# Patient Record
Sex: MALE | Race: WHITE | HISPANIC OR LATINO | Employment: FULL TIME | URBAN - METROPOLITAN AREA
[De-identification: names, ages, dates, MRNs, and addresses within clinical notes are randomized per-mention and may not be internally consistent; named-entity substitution may affect disease eponyms.]

---

## 2021-07-26 ENCOUNTER — APPOINTMENT (OUTPATIENT)
Dept: RADIOLOGY | Facility: MEDICAL CENTER | Age: 26
End: 2021-07-26
Attending: ORTHOPAEDIC SURGERY
Payer: COMMERCIAL

## 2021-07-26 ENCOUNTER — ANESTHESIA (OUTPATIENT)
Dept: SURGERY | Facility: MEDICAL CENTER | Age: 26
End: 2021-07-26
Payer: COMMERCIAL

## 2021-07-26 ENCOUNTER — ANESTHESIA EVENT (OUTPATIENT)
Dept: SURGERY | Facility: MEDICAL CENTER | Age: 26
End: 2021-07-26
Payer: COMMERCIAL

## 2021-07-26 ENCOUNTER — HOSPITAL ENCOUNTER (OUTPATIENT)
Facility: MEDICAL CENTER | Age: 26
End: 2021-07-26
Attending: ORTHOPAEDIC SURGERY | Admitting: ORTHOPAEDIC SURGERY
Payer: COMMERCIAL

## 2021-07-26 VITALS
SYSTOLIC BLOOD PRESSURE: 120 MMHG | BODY MASS INDEX: 19.17 KG/M2 | TEMPERATURE: 96.7 F | OXYGEN SATURATION: 97 % | HEART RATE: 90 BPM | DIASTOLIC BLOOD PRESSURE: 67 MMHG | RESPIRATION RATE: 18 BRPM | HEIGHT: 67 IN | WEIGHT: 122.14 LBS

## 2021-07-26 DIAGNOSIS — G89.18 POSTOPERATIVE PAIN OF EXTREMITY: ICD-10-CM

## 2021-07-26 DIAGNOSIS — M79.609 POSTOPERATIVE PAIN OF EXTREMITY: ICD-10-CM

## 2021-07-26 LAB
SARS-COV+SARS-COV-2 AG RESP QL IA.RAPID: NOTDETECTED
SPECIMEN SOURCE: NORMAL

## 2021-07-26 PROCEDURE — 700105 HCHG RX REV CODE 258: Performed by: ORTHOPAEDIC SURGERY

## 2021-07-26 PROCEDURE — 160035 HCHG PACU - 1ST 60 MINS PHASE I: Performed by: ORTHOPAEDIC SURGERY

## 2021-07-26 PROCEDURE — 700101 HCHG RX REV CODE 250: Performed by: ORTHOPAEDIC SURGERY

## 2021-07-26 PROCEDURE — 700102 HCHG RX REV CODE 250 W/ 637 OVERRIDE(OP): Performed by: ANESTHESIOLOGY

## 2021-07-26 PROCEDURE — C1713 ANCHOR/SCREW BN/BN,TIS/BN: HCPCS | Performed by: ORTHOPAEDIC SURGERY

## 2021-07-26 PROCEDURE — 73650 X-RAY EXAM OF HEEL: CPT | Mod: LT

## 2021-07-26 PROCEDURE — 87426 SARSCOV CORONAVIRUS AG IA: CPT

## 2021-07-26 PROCEDURE — 160009 HCHG ANES TIME/MIN: Performed by: ORTHOPAEDIC SURGERY

## 2021-07-26 PROCEDURE — 160048 HCHG OR STATISTICAL LEVEL 1-5: Performed by: ORTHOPAEDIC SURGERY

## 2021-07-26 PROCEDURE — 501838 HCHG SUTURE GENERAL: Performed by: ORTHOPAEDIC SURGERY

## 2021-07-26 PROCEDURE — 64445 NJX AA&/STRD SCIATIC NRV IMG: CPT | Performed by: ORTHOPAEDIC SURGERY

## 2021-07-26 PROCEDURE — 160002 HCHG RECOVERY MINUTES (STAT): Performed by: ORTHOPAEDIC SURGERY

## 2021-07-26 PROCEDURE — A9270 NON-COVERED ITEM OR SERVICE: HCPCS | Performed by: ORTHOPAEDIC SURGERY

## 2021-07-26 PROCEDURE — 160036 HCHG PACU - EA ADDL 30 MINS PHASE I: Performed by: ORTHOPAEDIC SURGERY

## 2021-07-26 PROCEDURE — 700101 HCHG RX REV CODE 250: Performed by: STUDENT IN AN ORGANIZED HEALTH CARE EDUCATION/TRAINING PROGRAM

## 2021-07-26 PROCEDURE — 700111 HCHG RX REV CODE 636 W/ 250 OVERRIDE (IP): Performed by: STUDENT IN AN ORGANIZED HEALTH CARE EDUCATION/TRAINING PROGRAM

## 2021-07-26 PROCEDURE — 160041 HCHG SURGERY MINUTES - EA ADDL 1 MIN LEVEL 4: Performed by: ORTHOPAEDIC SURGERY

## 2021-07-26 PROCEDURE — A9270 NON-COVERED ITEM OR SERVICE: HCPCS | Performed by: ANESTHESIOLOGY

## 2021-07-26 PROCEDURE — 160046 HCHG PACU - 1ST 60 MINS PHASE II: Performed by: ORTHOPAEDIC SURGERY

## 2021-07-26 PROCEDURE — 160047 HCHG PACU  - EA ADDL 30 MINS PHASE II: Performed by: ORTHOPAEDIC SURGERY

## 2021-07-26 PROCEDURE — 160029 HCHG SURGERY MINUTES - 1ST 30 MINS LEVEL 4: Performed by: ORTHOPAEDIC SURGERY

## 2021-07-26 PROCEDURE — 502000 HCHG MISC OR IMPLANTS RC 0278: Performed by: ORTHOPAEDIC SURGERY

## 2021-07-26 PROCEDURE — 160025 RECOVERY II MINUTES (STATS): Performed by: ORTHOPAEDIC SURGERY

## 2021-07-26 DEVICE — SCREW LOCKING SELF TAPPING WITH T8 STARDRIVE VA  RECESS 2.7MM 30MM (1TX6+1TX5=11): Type: IMPLANTABLE DEVICE | Site: FOOT | Status: FUNCTIONAL

## 2021-07-26 DEVICE — SCREW METAPHYSEAL SELF TAPPING WITH T8 STARDRIVE RECESS 2.7MM 36MM (1TX6+1TX2=8): Type: IMPLANTABLE DEVICE | Site: FOOT | Status: FUNCTIONAL

## 2021-07-26 DEVICE — SCREW LOCKING SELF TAPPING WITH T8 STARDRIVE VA  RECESS 2.7MM 28MM (1TX6+1TX3=9): Type: IMPLANTABLE DEVICE | Site: FOOT | Status: FUNCTIONAL

## 2021-07-26 DEVICE — IMPLANTABLE DEVICE: Type: IMPLANTABLE DEVICE | Site: FOOT | Status: FUNCTIONAL

## 2021-07-26 DEVICE — SCREW LOCKING SELF TAPPING WITH T8 STARDRIVE VA RECESS 2.7MM 32MM (1TX6+1TX5=11): Type: IMPLANTABLE DEVICE | Site: FOOT | Status: FUNCTIONAL

## 2021-07-26 DEVICE — SCREW LOCKING SELF TAPPING WITH T8 STARDRIVE VA RECESS 2.7MM 34MM (1TX6+1TX5=11): Type: IMPLANTABLE DEVICE | Site: FOOT | Status: FUNCTIONAL

## 2021-07-26 DEVICE — SCREW LOCKING SELF TAPPING WITH T8 STARDRIVE VA RECESS 2.7MM 36MM (1TX6+1TX5=11): Type: IMPLANTABLE DEVICE | Site: FOOT | Status: FUNCTIONAL

## 2021-07-26 RX ORDER — ROCURONIUM BROMIDE 10 MG/ML
INJECTION, SOLUTION INTRAVENOUS PRN
Status: DISCONTINUED | OUTPATIENT
Start: 2021-07-26 | End: 2021-07-26 | Stop reason: SURG

## 2021-07-26 RX ORDER — ONDANSETRON 2 MG/ML
4 INJECTION INTRAMUSCULAR; INTRAVENOUS
Status: DISCONTINUED | OUTPATIENT
Start: 2021-07-26 | End: 2021-07-26 | Stop reason: HOSPADM

## 2021-07-26 RX ORDER — SODIUM CHLORIDE, SODIUM LACTATE, POTASSIUM CHLORIDE, CALCIUM CHLORIDE 600; 310; 30; 20 MG/100ML; MG/100ML; MG/100ML; MG/100ML
INJECTION, SOLUTION INTRAVENOUS CONTINUOUS
Status: DISCONTINUED | OUTPATIENT
Start: 2021-07-26 | End: 2021-07-26 | Stop reason: HOSPADM

## 2021-07-26 RX ORDER — ACETAMINOPHEN 500 MG
1000 TABLET ORAL ONCE
Status: COMPLETED | OUTPATIENT
Start: 2021-07-26 | End: 2021-07-26

## 2021-07-26 RX ORDER — HYDROMORPHONE HYDROCHLORIDE 1 MG/ML
0.2 INJECTION, SOLUTION INTRAMUSCULAR; INTRAVENOUS; SUBCUTANEOUS
Status: DISCONTINUED | OUTPATIENT
Start: 2021-07-26 | End: 2021-07-26 | Stop reason: HOSPADM

## 2021-07-26 RX ORDER — MIDAZOLAM HYDROCHLORIDE 1 MG/ML
INJECTION INTRAMUSCULAR; INTRAVENOUS PRN
Status: DISCONTINUED | OUTPATIENT
Start: 2021-07-26 | End: 2021-07-26 | Stop reason: SURG

## 2021-07-26 RX ORDER — OXYCODONE AND ACETAMINOPHEN 10; 325 MG/1; MG/1
.5-1 TABLET ORAL EVERY 4 HOURS PRN
Qty: 42 TABLET | Refills: 0 | Status: SHIPPED | OUTPATIENT
Start: 2021-07-26 | End: 2021-08-02

## 2021-07-26 RX ORDER — HYDROMORPHONE HYDROCHLORIDE 1 MG/ML
0.1 INJECTION, SOLUTION INTRAMUSCULAR; INTRAVENOUS; SUBCUTANEOUS
Status: DISCONTINUED | OUTPATIENT
Start: 2021-07-26 | End: 2021-07-26 | Stop reason: HOSPADM

## 2021-07-26 RX ORDER — PROPOFOL 10 MG/ML
INJECTION, EMULSION INTRAVENOUS PRN
Status: DISCONTINUED | OUTPATIENT
Start: 2021-07-26 | End: 2021-07-26 | Stop reason: SURG

## 2021-07-26 RX ORDER — BUPIVACAINE HYDROCHLORIDE 5 MG/ML
INJECTION, SOLUTION EPIDURAL; INTRACAUDAL PRN
Status: DISCONTINUED | OUTPATIENT
Start: 2021-07-26 | End: 2021-07-26 | Stop reason: SURG

## 2021-07-26 RX ORDER — HALOPERIDOL 5 MG/ML
1 INJECTION INTRAMUSCULAR
Status: DISCONTINUED | OUTPATIENT
Start: 2021-07-26 | End: 2021-07-26 | Stop reason: HOSPADM

## 2021-07-26 RX ORDER — HYDROMORPHONE HYDROCHLORIDE 1 MG/ML
0.4 INJECTION, SOLUTION INTRAMUSCULAR; INTRAVENOUS; SUBCUTANEOUS
Status: DISCONTINUED | OUTPATIENT
Start: 2021-07-26 | End: 2021-07-26 | Stop reason: HOSPADM

## 2021-07-26 RX ORDER — MEPERIDINE HYDROCHLORIDE 25 MG/ML
12.5 INJECTION INTRAMUSCULAR; INTRAVENOUS; SUBCUTANEOUS
Status: DISCONTINUED | OUTPATIENT
Start: 2021-07-26 | End: 2021-07-26 | Stop reason: HOSPADM

## 2021-07-26 RX ORDER — SODIUM CHLORIDE, SODIUM LACTATE, POTASSIUM CHLORIDE, CALCIUM CHLORIDE 600; 310; 30; 20 MG/100ML; MG/100ML; MG/100ML; MG/100ML
INJECTION, SOLUTION INTRAVENOUS CONTINUOUS
Status: ACTIVE | OUTPATIENT
Start: 2021-07-26 | End: 2021-07-26

## 2021-07-26 RX ORDER — DEXAMETHASONE SODIUM PHOSPHATE 4 MG/ML
INJECTION, SOLUTION INTRA-ARTICULAR; INTRALESIONAL; INTRAMUSCULAR; INTRAVENOUS; SOFT TISSUE PRN
Status: DISCONTINUED | OUTPATIENT
Start: 2021-07-26 | End: 2021-07-26 | Stop reason: SURG

## 2021-07-26 RX ORDER — OXYCODONE HCL 5 MG/5 ML
5 SOLUTION, ORAL ORAL
Status: DISCONTINUED | OUTPATIENT
Start: 2021-07-26 | End: 2021-07-26 | Stop reason: HOSPADM

## 2021-07-26 RX ORDER — PHENYLEPHRINE HCL IN 0.9% NACL 0.5 MG/5ML
SYRINGE (ML) INTRAVENOUS PRN
Status: DISCONTINUED | OUTPATIENT
Start: 2021-07-26 | End: 2021-07-26 | Stop reason: SURG

## 2021-07-26 RX ORDER — HYDROCODONE BITARTRATE AND ACETAMINOPHEN 5; 325 MG/1; MG/1
1-2 TABLET ORAL EVERY 6 HOURS PRN
Status: ON HOLD | COMMUNITY
Start: 2021-07-08 | End: 2021-07-26

## 2021-07-26 RX ORDER — CEFAZOLIN SODIUM 1 G/3ML
INJECTION, POWDER, FOR SOLUTION INTRAMUSCULAR; INTRAVENOUS PRN
Status: DISCONTINUED | OUTPATIENT
Start: 2021-07-26 | End: 2021-07-26 | Stop reason: SURG

## 2021-07-26 RX ORDER — CELECOXIB 200 MG/1
200 CAPSULE ORAL ONCE
Status: COMPLETED | OUTPATIENT
Start: 2021-07-26 | End: 2021-07-26

## 2021-07-26 RX ORDER — DIPHENHYDRAMINE HYDROCHLORIDE 50 MG/ML
12.5 INJECTION INTRAMUSCULAR; INTRAVENOUS
Status: DISCONTINUED | OUTPATIENT
Start: 2021-07-26 | End: 2021-07-26 | Stop reason: HOSPADM

## 2021-07-26 RX ORDER — OXYCODONE HCL 5 MG/5 ML
10 SOLUTION, ORAL ORAL
Status: DISCONTINUED | OUTPATIENT
Start: 2021-07-26 | End: 2021-07-26 | Stop reason: HOSPADM

## 2021-07-26 RX ADMIN — Medication 100 MCG: at 16:01

## 2021-07-26 RX ADMIN — Medication 100 MCG: at 16:42

## 2021-07-26 RX ADMIN — CELECOXIB 200 MG: 200 CAPSULE ORAL at 13:53

## 2021-07-26 RX ADMIN — ROCURONIUM BROMIDE 40 MG: 10 INJECTION, SOLUTION INTRAVENOUS at 15:20

## 2021-07-26 RX ADMIN — SUGAMMADEX 200 MG: 100 INJECTION, SOLUTION INTRAVENOUS at 17:47

## 2021-07-26 RX ADMIN — CEFAZOLIN 2 G: 330 INJECTION, POWDER, FOR SOLUTION INTRAMUSCULAR; INTRAVENOUS at 15:23

## 2021-07-26 RX ADMIN — MIDAZOLAM HYDROCHLORIDE 2 MG: 1 INJECTION, SOLUTION INTRAMUSCULAR; INTRAVENOUS at 15:16

## 2021-07-26 RX ADMIN — DEXAMETHASONE SODIUM PHOSPHATE 8 MG: 4 INJECTION, SOLUTION INTRA-ARTICULAR; INTRALESIONAL; INTRAMUSCULAR; INTRAVENOUS; SOFT TISSUE at 15:29

## 2021-07-26 RX ADMIN — LIDOCAINE HYDROCHLORIDE 0.5 ML: 10 INJECTION, SOLUTION EPIDURAL; INFILTRATION; INTRACAUDAL; PERINEURAL at 13:38

## 2021-07-26 RX ADMIN — FENTANYL CITRATE 50 MCG: 50 INJECTION, SOLUTION INTRAMUSCULAR; INTRAVENOUS at 17:46

## 2021-07-26 RX ADMIN — ROCURONIUM BROMIDE 10 MG: 10 INJECTION, SOLUTION INTRAVENOUS at 16:58

## 2021-07-26 RX ADMIN — ACETAMINOPHEN 1000 MG: 500 TABLET ORAL at 13:53

## 2021-07-26 RX ADMIN — PROPOFOL 150 MG: 10 INJECTION, EMULSION INTRAVENOUS at 15:20

## 2021-07-26 RX ADMIN — POVIDONE IODINE 15 ML: 100 SOLUTION TOPICAL at 13:37

## 2021-07-26 RX ADMIN — SODIUM CHLORIDE, POTASSIUM CHLORIDE, SODIUM LACTATE AND CALCIUM CHLORIDE: 600; 310; 30; 20 INJECTION, SOLUTION INTRAVENOUS at 13:49

## 2021-07-26 RX ADMIN — BUPIVACAINE HYDROCHLORIDE 66 MG: 5 INJECTION, SOLUTION EPIDURAL; INTRACAUDAL; PERINEURAL at 14:49

## 2021-07-26 RX ADMIN — Medication 100 MCG: at 15:49

## 2021-07-26 RX ADMIN — Medication 100 MCG: at 16:11

## 2021-07-26 RX ADMIN — FENTANYL CITRATE 100 MCG: 50 INJECTION, SOLUTION INTRAMUSCULAR; INTRAVENOUS at 15:19

## 2021-07-26 ASSESSMENT — PAIN DESCRIPTION - PAIN TYPE
TYPE: SURGICAL PAIN

## 2021-07-26 NOTE — ANESTHESIA PREPROCEDURE EVALUATION
Left Calcaneous fracture    Relevant Problems   No relevant active problems       Physical Exam    Airway   Mallampati: II  TM distance: >3 FB  Neck ROM: full       Cardiovascular - normal exam  Rhythm: regular  Rate: normal  (-) murmur     Dental - normal exam           Pulmonary - normal exam  Breath sounds clear to auscultation     Abdominal    Neurological - normal exam                 Anesthesia Plan    ASA 1       Plan - general and peripheral nerve block     Peripheral nerve block will be post-op pain control  Airway plan will be LMA          Induction: intravenous    Postoperative Plan: Postoperative administration of opioids is intended.    Pertinent diagnostic labs and testing reviewed    Informed Consent:    Anesthetic plan and risks discussed with patient.    Use of blood products discussed with: patient whom consented to blood products.

## 2021-07-26 NOTE — ANESTHESIA PROCEDURE NOTES
Peripheral Block    Date/Time: 7/26/2021 2:44 PM  Performed by: Julien Ludwig M.D.  Authorized by: Julien Ludwig M.D.     Patient Location:  Pre-op  Start Time:  7/26/2021 2:44 PM  End Time:  7/26/2021 2:49 PM  Reason for Block: at surgeon's request and post-op pain management ONLY    patient identified, IV checked, site marked, risks and benefits discussed, surgical consent, monitors and equipment checked, pre-op evaluation and timeout performed    Patient Position:  Supine  Prep: ChloraPrep    Monitoring:  Heart rate, continuous pulse ox and cardiac monitor  Block Region:  Lower Extremity  Lower Extremity - Block Type:  SCIATIC nerve block, lateral approach    Laterality:  Left  Procedures: ultrasound guided  Image captured, interpreted and electronically stored.  Local Infiltration:  Lidocaine  Strength:  1 %  Dose:  3 ml  Block Type:  Single-shot  Needle Length:  100mm  Needle Gauge:  21 G  Needle Localization:  Ultrasound guidance  Injection Assessment:  Negative aspiration for heme, no paresthesia on injection, incremental injection and local visualized surrounding nerve on ultrasound  Evidence of intravascular injection: No     US Guided Sciatic Nerve Block   US probe placed several cm proximal to popliteal crease on posterior thigh and scanned caudad and cephalad until Sciatic Nerve (SN) identified superficial/lateral to popliteal artery.  Needle inserted lateral to probe in an in plane approach under direct visualization to a perineural position.  After negative aspiration LA injected with ease and visualized surrounding the SN.

## 2021-07-26 NOTE — ANESTHESIA PROCEDURE NOTES
Airway  Performed by: Julien Ludwig M.D.  Authorized by: Julien Ludwig M.D.     Location:  OR  Urgency:  Elective  Indications for Airway Management:  Anesthesia      Spontaneous Ventilation: absent    Sedation Level:  Deep  Preoxygenated: Yes    Patient Position:  Sniffing  Final Airway Type:  Endotracheal airway  Final Endotracheal Airway:  ETT  Cuffed: Yes    Technique Used for Successful ETT Placement:  Direct laryngoscopy    Insertion Site:  Oral  Blade Type:  Allan  Laryngoscope Blade/Videolaryngoscope Blade Size:  3  ETT Size (mm):  7.0  Measured from:  Teeth  ETT to Teeth (cm):  23  Placement Verified by: auscultation and capnometry    Cormack-Lehane Classification:  Grade I - full view of glottis  Number of Attempts at Approach:  1

## 2021-07-26 NOTE — PROGRESS NOTES
Med rec updated and complete  Allergies reviewed  Used  ipad spoke to Starr ([id number]) to verify all prescription medications   Pt reports no antibiotics in the last 30 days.

## 2021-07-27 NOTE — DISCHARGE INSTRUCTIONS
Instrucciones Para [place]  (Home Care Instructions)    ACTIVIDAD: Descanse y tome todo con mucha calma las primeras 24 horas después de perez cirugía.  Adriana persona adulta responsable debe permanecer con usted everardo jia periodo de tiempo.  Es normal sentirse sonoliento o sonolienta everardo esas primeras horas.  Le recomendamos que no antonio nada que requiera equilibrio, nya decisiones a mucha coordinación de perez parte.    NO ANTONIO ESTO PURANTE LAS PRIMERAS 24 HORAS:   Manejar o conducir algún vehiculo, operar maquinarias o utilizar electrodomesticos.   Beber cerveza o algún otro tipo de bebida alcohólica.   Nya decisiones importantes o firmar documentos legales.    INSTRUCCIONES ESPECIALES:  - No soporte de peso a la pierna izquierda en férula con muletas.  - Mantenga la férula limpia y seca.  - Seguimiento a las 2 semanas postoperatorias según lo programado.  - Receta de Percocet según sea necesario para el dolor postoperatorio moderado.  - Está lyle nya ibuprofeno y / o tylenol de venta judith según sea necesario para el dolor postoperatorio leve.    DIETA: Para evitar las nauseas, prosiga despacito con perez dieta a medida que pueda ir tolerándola mejor, evite comidas muy condimentadas o grasosas everardo jia primer día.  Vaya agregando comidas más substanciadas a perez dieta a medida que asi lo indique perez médica.  Los bebés pueden beber leche preparada o formula, ásl sydnee también leche del seno de la madre a medida que vayan teniendo hambre.  SIGA AGREGANDO LIQUIDOS Y COMIDAS CON FIBRA PARA EVITAR ESTREÑIMIENTO.    MEDICAMENTOS/MEDICINAS:  Vuelva a nya flor medicamentos diarios.  [place] los medicamentos que se le prescribe con un poco de comida.  Si no le prescribe ningún tipo de medicamento, entonces puede nya medicinas para el dolor que no contienen aspirina, si las necesita.  LAS MEDICINAS PARA EL DOLOR PUEDEN ESTREÑIRLE MUCHO.  [place] un suavizante para el excremento o materia fecal (stool softener) o un laxativo  sydnee por ejemplo: senokot, pericolase, o leche de magnesia, si lo necesita.    Receta de Percocet enviada a gonzalez farmacia.  La ultima sosis de medicina para el dolor fue administrada 4.     Usted debe LIAMAR A GONZALEZ MEDICO si tiene los siguientes síntomas:   -   Adriana fiebre más alfreda de 101 grados Fahrenheit.   -   Un dolor incesante aún con los medicamentos, o nauseas y vómito persistente.   -   Un sangrado excesivo (eduardo que traspasa los vendajes o gasas) o algúln tipo de drenaje inesperado que proviene de la henda.     -   Un color rae exagerado o hinchazón alrededor del área en donde se le hizo incisión o evens, o un drenaje de pus o con olor nelly proveniente de la henda.   -    La inhabilidad de orinar o vaciar gonzalez vejiga en 8 horas.   -    Problemas con a respiración o harvey en el pecho.    Usted debe llamar al 911 si se presentan problemas con el dolor al respirar o el pecho.  Si no se puede ponnoer en comunicación con un medica o con el centro de cirugía, usted debe ir a la estación de emergencia (emergency room) más cercana o a un centro de atención de urgencia (urgent care center).  El teléfono del medico es: Dr. Cantu [phone]    LOS SÍNTOMAS DE UN LEVE RESFRIO SON MUY NORMALES.  ADEMÁS USTED PUEDE LLEGAR A SENTIR HARVEY GENERALES DE MÚSCULOS, IRRITACIÓN EN LA GARGANTA, HARVEY DE THANH Y/O UN POCO DE NAUSEAS.    Sie tiene alguna pregunta, llame a gonzalez médico.  Si gonzalez médico no se encuentra disponible, por favor llame al Centro de Cirugía at [phone].  el Centro está abierto de Lunes a Viernes desde las 7:00 de la manana hasta las 5:00 de la noche.  Usted también puede llamar al CENTRO DE LLAMADAS SOBRE LA DEMI o HEALTH HOTLINE.  [place] está abierto viente y cuatro horas por jim, siete peter por semana, allí podrá hablar con adriana enfermera.  Llame al [phone], o al [facility] [phone].    Mi firma a continuación indica que he recibido y entiendco estas instrucciones acera de los  cuidados en la casa (Home Care Instructions)    · Usted recibirá yamilet encuesta en la correspondencia en las siguientes semanas y le pedimos que por favor tome un momento para completar zunilda encuesta y regresaría a hosotros.  Nuestro objetivó es brindarle un cuidado muy childers y par lo tanto apreciamos flor coméntanos.  Muchas lucien por marycruz escogido el Centro de Cirugía [facility].      ACTIVITY: Rest and take it easy for the first 24 hours.  A responsible adult is recommended to remain with you during that time.  It is normal to feel sleepy.  We encourage you to not do anything that requires balance, judgment or coordination.    MILD FLU-LIKE SYMPTOMS ARE NORMAL. YOU MAY EXPERIENCE GENERALIZED MUSCLE ACHES, THROAT IRRITATION, HEADACHE AND/OR SOME NAUSEA.    FOR 24 HOURS DO NOT:  Drive, operate machinery or run household appliances.  Drink beer or alcoholic beverages.   Make important decisions or sign legal documents.    SPECIAL INSTRUCTIONS:   - Non-weight bearing to left leg in splint with crutches.  - Keep splint clean and dry.  - Follow-up in 2 weeks post-op as scheduled.  - Prescription for Percocet as needed for moderate post-op pain.  - Okay to take over the counter ibuprofen and/or tylenol as needed for mild post-op pain.    DIET: To avoid nausea, slowly advance diet as tolerated, avoiding spicy or greasy foods for the first day.  Add more substantial food to your diet according to your physician's instructions.  Babies can be fed formula or breast milk as soon as they are hungry.  INCREASE FLUIDS AND FIBER TO AVOID CONSTIPATION.    You should CALL YOUR PHYSICIAN if you develop:  Fever greater than 101 degrees F.  Pain not relieved by medication, or persistent nausea or vomiting.  Excessive bleeding (blood soaking through dressing) or unexpected drainage from the wound.  Extreme redness or swelling around the incision site, drainage of pus or foul smelling drainage.  Inability to  urinate or empty your bladder within 8 hours.  Problems with breathing or chest pain.    You should call 911 if you develop problems with breathing or chest pain.  If you are unable to contact your doctor or surgical center, you should go to the nearest emergency room or urgent care center.  Physician's telephone #: Dr. Cantu [phone]    If any questions arise, call your doctor.  If your doctor is not available, please feel free to call the Surgical Center at [phone]. The Contact Center is open Monday through Friday 7AM to 5PM and may speak to a nurse at [phone], or toll free at [phone].     A registered nurse may call you a few days after your surgery to see how you are doing after your procedure.    MEDICATIONS: Resume taking daily medication.  Take prescribed pain medication with food.  If no medication is prescribed, you may take non-aspirin pain medication if needed.  PAIN MEDICATION CAN BE VERY CONSTIPATING.  Take a stool softener or laxative such as senokot, pericolace, or milk of magnesia if needed.    Prescription for Percocet sent to your pharmacy.  Last pain medication given: none.    If your physician has prescribed pain medication that includes Acetaminophen (Tylenol), do not take additional Acetaminophen (Tylenol) while taking the prescribed medication.    Depression / Suicide Risk    As you are discharged from this [facility] facility, it is important to learn how to keep safe from harming yourself.    Recognize the warning signs:  · Abrupt changes in personality, positive or negative- including increase in energy   · Giving away possessions  · Change in eating patterns- significant weight changes-  positive or negative  · Change in sleeping patterns- unable to sleep or sleeping all the time   · Unwillingness or inability to communicate  · Depression  · Unusual sadness, discouragement and loneliness  · Talk of wanting to die  · Neglect of personal  appearance   · Rebelliousness- reckless behavior  · Withdrawal from people/activities they love  · Confusion- inability to concentrate     If you or a loved one observes any of these behaviors or has concerns about self-harm, here's what you can do:  · Talk about it- your feelings and reasons for harming yourself  · Remove any means that you might use to hurt yourself (examples: pills, rope, extension cords, firearm)  · Get professional help from the community (Mental Health, Substance Abuse, psychological counseling)  · Do not be alone:Call your Safe Contact- someone whom you trust who will be there for you.  · Call your local CRISIS HOTLINE 340-7001 or 034-211-2851  · Call your local Children's Mobile Crisis Response Team [place] (973) 277-7921 or www.[employer])  · Call the toll free National Suicide Prevention Hotlines   · National Suicide Prevention Lifeline 560-217-MUYX (3865)  National Hope Line Network 800-SUICIDE (206-3243)

## 2021-07-27 NOTE — OR NURSING
1752 Pt arrived from OR via gurney. Report given by anesthesia. Sleeping, OPA, 8L mask, even non labored breathing, lungs clear, airway patent. SR. Skin pink warm dry. PIV patent. LLE toes pink warm, brisk cap refill, dressing/cast cdi, elevated on pillow, cold pack, boot on bed.     1800 OPA removed. Spontaneous even non labored breathing. Arousable.  used, [id number]. Denies pain, nausea, sob, chest pain. L toes unable to wiggle, s/p nerve block. States partial sensation to pressure.     1815 awake and alert. Denies pain and nausea. Tolerates sips of water.    1830 updated significant other, Rosii. O2 tank at 75% full for transfer. LLE dressing cdi, brisk cap refill, pink warm.     1845 resting with eyes closed, face relaxed. Even non labored breathing. Skin pink warm dry.  RA.     1854 Report given to Aura BURRELL    1855 pt ready for phase 2. VSS

## 2021-07-27 NOTE — ANESTHESIA POSTPROCEDURE EVALUATION
Patient: Gilmar Sanders    Procedure Summary     Date: 07/26/21 Room / Location: [place] [address] / SURGERY [facility]    Anesthesia Start: 1516 Anesthesia Stop: 1756    Procedure: ORIF, FRACTURE, CALCANEUS - AND OTHER INDICATED PROCEDURES (Left Foot) Diagnosis: (CLOSED FRACTURE CALCANEUS, INTR-ARTICULAR)    Surgeons: Arun Cantu M.D. Responsible Provider: Julien Ludwig M.D.    Anesthesia Type: general, peripheral nerve block ASA Status: 1          Final Anesthesia Type: general, peripheral nerve block  Last vitals  BP   Blood Pressure: 120/67    Temp   35.9 °C (96.7 °F)    Pulse   90   Resp   18    SpO2   97 %      Anesthesia Post Evaluation    Patient location during evaluation: PACU  Patient participation: complete - patient participated  Level of consciousness: awake and alert    Airway patency: patent  Anesthetic complications: no  Cardiovascular status: hemodynamically stable  Respiratory status: acceptable  Hydration status: euvolemic    PONV: none          No complications documented.     Nurse Pain Score: 2 (NPRS)

## 2021-07-27 NOTE — OR NURSING
Assumed care of patient from PACU, see flow sheets for vital signs. Patient alert and oriented times four. Assessment completed. Surgical incision assessed, dressing clean, dry and intact. Pain is controlled and tolerable for patient. Patient updated of plan of care for discharge. Family/friend at bedside with patient. Discharge instructions reviewed and all questions answered. All needs met, patient dressed and safe to discharge home.   Patient tolerating fluids and food with no nausea.    2015: Patient ready to go, last vital signs in flow sheet. PIV removed. Patient taken to car in wheelchair. Patient safe to discharge.

## 2021-07-27 NOTE — OR SURGEON
Immediate Post OP Note    PreOp Diagnosis: Left intraarticular calcaneus fracture      PostOp Diagnosis: same      Procedure(s):  ORIF, FRACTURE, CALCANEUS - AND OTHER INDICATED PROCEDURES - Wound Class: Clean    Surgeon(s):  Arun Cantu M.D.    Anesthesiologist/Type of Anesthesia:  Anesthesiologist: Julien Ludwig M.D./General    Surgical Staff:  Circulator: Lukas D. Gansert, R.N.  Scrub Person: Juan Hackett  Radiology Technologist: Jimbo Starkey    Specimens removed if any:  * No specimens in log *    Estimated Blood Loss: minimal    Findings: see dictation    Complications: none known    PLAN:  --discharge to home from PACU  --NWB LLE in splint with crutches  --fu 2 weeks postop as scheduled  --Rx for percocet        7/26/2021 5:45 PM Arun Cantu M.D.

## 2021-07-27 NOTE — ANESTHESIA TIME REPORT
Anesthesia Start and Stop Event Times     Date Time Event    7/26/2021 1457 Ready for Procedure     1516 Anesthesia Start     1756 Anesthesia Stop        Responsible Staff  07/26/21    Name Role Begin End    Julien Ludwig M.D. Anesth 1516 1756        Preop Diagnosis (Free Text):  Pre-op Diagnosis     CLOSED FRACTURE CALCANEUS, INTR-ARTICULAR        Preop Diagnosis (Codes):    Post op Diagnosis  Calcaneus fracture      Premium Reason  A. 3PM - 7AM    Comments:

## 2021-07-27 NOTE — OP REPORT
DATE OF SERVICE:  07/26/2021     PREOPERATIVE DIAGNOSIS:  Left comminuted intra-articular calcaneus fracture.     POSTOPERATIVE DIAGNOSIS:  Left comminuted intra-articular calcaneus fracture.     PROCEDURE PERFORMED:  Open treatment with internal fixation, left calcaneus   fracture.     SURGEON:  Arun Cantu MD     ANESTHESIOLOGIST:  Julien Ludwig MD     ANESTHESIA:  General and regional.     ESTIMATED BLOOD LOSS:  Minimal.     TOURNIQUET TIME:  118 minutes at 250 mmHg, left thigh.     IMPLANTS:  Synthes variable angle calcaneal locking plate.     INDICATIONS FOR PROCEDURE:  The patient is a 26-year-old male.  He fell from   height at work.  He works as a .  He sustained a comminuted displaced   intra-articular calcaneus fracture.  I saw him 3 days ago in my clinic.  He   was 15 days post injury at that point and I recommend surgical reduction and   fixation for his comminuted displaced intra-articular calcaneus fracture.  He   signed informed consent preoperatively and wished to proceed with surgery as   outlined above.     DESCRIPTION OF PROCEDURE:  The patient was met in the preoperative holding   area.  Surgical site was signed.  His consent was confirmed to be accurate.    He was taken back to the operating room and general anesthesia was induced   after Dr. Ludwig performed a regional anesthetic at my request to help with   postoperative pain control.  He was positioned in the right lateral decubitus   position with an axillary roll and a beanbag.  Left lower extremity was   positioned over a leg tunnel bone foam.  A tourniquet was applied to left   thigh.  The left lower extremity was then prepped and draped in the usual   sterile fashion.  A formal timeout was performed to confirm the patient's   correct name, correct surgical site, correct procedure and correct laterality.    The limb was then exsanguinated with an Esmarch and the tourniquet was   inflated to 250 mmHg.  An extensile lateral approach  to the calcaneus was   performed with a scalpel down through skin at the apex of the fracture.  Sharp   dissection was carried down to the bone and subperiosteal elevation was   performed to elevate the lateral flap off of the bone directly.  I released   the calcaneofibular ligament and the peroneal retinaculum and retracted the   peroneal tendons.  I placed a 3.2 mm smooth Steinmann pins in both the   calcaneus and distal fibula to retract the flap using the no-touch technique   and then there was some early soft callus formation that I debrided with a   rongeur and using osteotome to expose the comminuted displaced lateral wall   and the posterior facet, which was significantly rotated and displaced. The   calcaneal tuberosity was significantly displaced as well as was the anterior   process where there was intra-articular involvement extending to the   calcaneocuboid joint. After thoroughly cleaning the fracture of soft tissue   and hematoma, I then was able to reduce the posterior facet in to the medial   intact posterior facet piece and stabilized with several 1.6 mm K-wires and   placed two 2.7 mm lag screws from lateral to medial using lag screw technique.    I placed a fluoroscopic imaging to confirm acceptable articular reduction   and there was good visual feedback and palpable feedback that the posterior   facet was congruent, very close to anatomic.  I then was able to reduce   calcaneal tuberosity using a Schanz pin through a percutaneous skin incision   at the heel and then I was able to reduce the anterior process and better   alignment at the angle of Gissane using a ball-spike pusher, which I   stabilized with several 1.6 mm K-wires and positioned in the lateral plate,   replaced the lateral wall and some cancellous bone that I had saved as a bone   graft underneath the posterior facet piece and then fixed it with bicortical   nonlocking screws anteriorly at the anterior process just underneath the    subtalar joint as well as the tuberosity. Overall, I felt the alignment was   acceptable.  I placed several locking screws and exchanged one of the   nonlocking screws in the  tuberosity, which was a little bit long for a   shorter locking screw.  Final fluoroscopic imaging then confirmed overall   acceptable alignment of the fracture and acceptable position of the implants.    The wounds were thoroughly irrigated with normal saline.  I repaired the   periosteal layers with 0 Vicryl, subQ layers with 2-0 Vicryl and the skin   edges with nylon in a modified Allgower-Donati suture configuration.  I then   thoroughly cleansed and dried the wound and placed in a sterile dressing and a   well-padded short leg plaster splint.  Tourniquet was deflated after 118   minutes.  He was awoken from anesthesia and transferred on the gurney and   taken to postanesthesia care unit in stable condition.     PLAN:  1.  The patient will be discharged home from the PACU when he recovers from   anesthesia.  2.  He should be nonweightbearing left lower extremity with a splint with   crutches.  3.  He should follow up 2 weeks postop as scheduled for routine wound check,   suture removal, if there is appropriate healing, and transition to his boot.        ______________________________  MD CHARLI Burgos/YARITZA    DD:  07/26/2021 17:57  DT:  07/26/2021 18:46    Job#:  [id number]

## 2023-02-10 ENCOUNTER — HOSPITAL ENCOUNTER (EMERGENCY)
Facility: MEDICAL CENTER | Age: 28
End: 2023-02-11
Attending: EMERGENCY MEDICINE

## 2023-02-10 DIAGNOSIS — F14.90 COCAINE USE: ICD-10-CM

## 2023-02-10 DIAGNOSIS — F10.10 ALCOHOL ABUSE: ICD-10-CM

## 2023-02-10 LAB — EKG IMPRESSION: NORMAL

## 2023-02-10 PROCEDURE — 93005 ELECTROCARDIOGRAM TRACING: CPT

## 2023-02-10 PROCEDURE — 99284 EMERGENCY DEPT VISIT MOD MDM: CPT

## 2023-02-10 PROCEDURE — 93005 ELECTROCARDIOGRAM TRACING: CPT | Performed by: EMERGENCY MEDICINE

## 2023-02-11 ENCOUNTER — APPOINTMENT (OUTPATIENT)
Dept: RADIOLOGY | Facility: MEDICAL CENTER | Age: 28
End: 2023-02-11
Attending: EMERGENCY MEDICINE

## 2023-02-11 VITALS
HEIGHT: 65 IN | HEART RATE: 133 BPM | OXYGEN SATURATION: 97 % | SYSTOLIC BLOOD PRESSURE: 144 MMHG | RESPIRATION RATE: 20 BRPM | TEMPERATURE: 99.2 F | WEIGHT: 144.4 LBS | BODY MASS INDEX: 24.06 KG/M2 | DIASTOLIC BLOOD PRESSURE: 85 MMHG

## 2023-02-11 RX ORDER — DIAZEPAM 5 MG/ML
5 INJECTION, SOLUTION INTRAMUSCULAR; INTRAVENOUS ONCE
Status: DISCONTINUED | OUTPATIENT
Start: 2023-02-11 | End: 2023-02-11 | Stop reason: HOSPADM

## 2023-02-11 RX ORDER — SODIUM CHLORIDE 9 MG/ML
1000 INJECTION, SOLUTION INTRAVENOUS ONCE
Status: DISCONTINUED | OUTPATIENT
Start: 2023-02-11 | End: 2023-02-11 | Stop reason: HOSPADM

## 2023-02-11 NOTE — ED PROVIDER NOTES
ED Provider Note    CHIEF COMPLAINT  Chief Complaint   Patient presents with    ETOH Withdrawal     PT STATES HE HAD 3 BEERS TONIGHT. COCAINE USE TONIGHT FAMILY WANTS PATIENT TO HAVE DETOX.      EXTERNAL RECORDS REVIEWED  Other none    HPI/ROS  LIMITATION TO HISTORY   Select: Language Khmer,  Used   OUTSIDE HISTORIAN(S):  Family at bedside    Gilmar Sanders is a 28 y.o. male who presents emergency room accompanied by his family member for concerns regarding alcohol use and cocaine use.  Patient has been dependent on some of these medications in the past and his family members feel like he is acting somewhat erratically.  He is alert, oriented and speaking Taiwanese with my  coherently and is currently stating that while he uses these medications he is not having thoughts of self-harm, he is not having any chest pain, belly pain or any other acute complaints at this time.  He can feel his heart racing and believes this is a side effect of the drugs he had used earlier.  I had extensive conversation with him regarding his intention to be treated and his desires.  Family is at bedside wanting him evaluated    PAST MEDICAL HISTORY   none    SURGICAL HISTORY   has a past surgical history that includes open treatment calcaneal fracture (Left, 7/26/2021).    FAMILY HISTORY  History reviewed. No pertinent family history.    SOCIAL HISTORY  Social History     Tobacco Use    Smoking status: Some Days     Packs/day: 0.25     Years: 2.00     Pack years: 0.50     Types: Cigarettes     Start date: 7/1/2019    Smokeless tobacco: Never   Vaping Use    Vaping Use: Never used   Substance and Sexual Activity    Alcohol use: Yes    Drug use: Yes     Types: Inhaled     Comment: COCAINE    Sexual activity: Not on file       CURRENT MEDICATIONS  Home Medications    **Home medications have not yet been reviewed for this encounter**         ALLERGIES  No Known Allergies    PHYSICAL EXAM  VITAL SIGNS: BP (!) 149/86    "Pulse (!) 133   Temp 37.3 °C (99.2 °F) (Temporal)   Resp 20   Ht 1.651 m (5' 5\")   Wt 65.5 kg (144 lb 6.4 oz)   SpO2 97%   BMI 24.03 kg/m²    Genl: M sitting in chair comfortably, speaking clearly, appears in no acute distress   Head: NC/AT   ENT: Mucous membranes moist, posterior pharynx clear, uvula midline, nares patent bilaterally   Eyes: Normal sclera, pupils equal round reactive to light  Neck: Supple, FROM, no LAD appreciated   Pulmonary: Lungs are clear to auscultation bilaterally  Chest: No TTP  CV:  tachycardia, no murmur appreciated, pulses 2+ in both upper and lower extremities,  Abdomen: soft, NT/ND; no rebound/guarding, no masses palpated, no HSM   : no CVA or suprapubic tenderness   Musculoskeletal: Pain free ROM of the neck. Moving upper and lower extremities and spontaneous in coordinated fashion  Neuro: A&Ox4 (person, place, time, situation), speech fluent, gait steady, no focal deficits appreciated, No cerebellar signs. Sensation is grossly intact in the distal upper and lower extremities.  5/5 strength in  and dorsiflexion/plantar flexion of the ankles  Psych: Patient has an appropriate affect and behavior  Skin: No rash or lesions.  No pallor or jaundice.  No cyanosis.  Warm and dry.     DIAGNOSTIC STUDIES / PROCEDURES  EKG  I have independently interpreted this EKG  Pt declined    LABS  Pt declined    RADIOLOGY  Pt declined    COURSE & MEDICAL DECISION MAKING    ED Observation Status? No; Patient does not meet criteria for ED Observation.     INITIAL ASSESSMENT, COURSE AND PLAN  Care Narrative: Patient presents emergency room for tachycardia and likely side effects of his drug use.  He does not appear to be in fulminant alcohol withdrawal and is denying excessive alcohol consumption.  He does not have the sequela of excessive alcohol use and while he is tachycardic he is hemodynamically stable, afebrile with no acute abdominal or chest discomfort.  I have discussed both with the " patient and have his permission the family members the concerns I have about them being adamant about his care.  I understand there are concerns regarding his drug use however at this time he is alert, oriented and does not want any further interventions or care at this time.  I had an extensive interview with the patient and he does not have other concerning psychiatric illness or any signs of acute mental status changes and he clearly demonstrates capacity to make his own decision making.  I have also further expressed my concerns regarding the possible toxic effects of alcohol and cocaine use and my advisement for anyone who presents with the symptomology is his blood work, EKG chest x-ray and assessment.  He is politely declining these and is able to verbalize my concerns will be discharged AGAINST MEDICAL ADVICE.  He is given resources for establishment of care with a primary care doctor and drug treatment and rehab facilities in her area.    DISPOSITION AND DISCUSSIONS  Discharged AGAINST MEDICAL ADVICE    FINAL DIAGNOSIS  1. Alcohol abuse    2. Cocaine use      Electronically signed by: Kyle Larson M.D., 2/11/2023 12:23 AM

## 2023-02-11 NOTE — ED NOTES
Patient left room and returned to waiting room. Patient continues to not want to be in the ED but family convinces him to remain and be evaluated. Patient returned to room and placed on monitor.

## 2023-02-11 NOTE — ED NOTES
Patient ambulated from lobby to room independently with steady gait accompanied by family.    Chart up for ERP.

## 2023-02-11 NOTE — ED NOTES
[employer] CALLED AND STATES THE FAMILY HAS ADDITIONAL CONCERNS THAT THE PATIENT IS DRINKING MORE THAT HE ADMITS AND THAT HE HAS BECOME SUPER PARANOID AT HOME. 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

## 2023-02-11 NOTE — ED NOTES
Patient requesting to leave AMA. ERP aware and AMA signed by patient. Patient given resources for detox and substance abuse. Patient ambulated with steady gait to lobby for discharge with all belongings.

## 2023-02-11 NOTE — ED TRIAGE NOTES
"Chief Complaint   Patient presents with    ETOH Withdrawal     PT STATES HE HAD 3 BEERS TONIGHT. COCAINE USE TONIGHT FAMILY WANTS PATIENT TO HAVE DETOX.            PT AMBULATORY TO TRIAGE. Pt AA&O X 3, PATIENT STATES HIS FAMILY BROUGHT HIM IN TO DETOX FROM ALCOHOL AND COCAINE. PT SATES HE HAS HAD 3 BEERS TONIGHT AND A SMALL AMOUNT OF COCAINE. PT STATE HE IS HERE BECAUSE HE FAMILY WANTS HIM TO BE.      PT TO EKG TRIAGE ROOM . PT EDUCATED ON ALERTING STAFF TO CHANGES IN CONDITION. PT VERBALIZED AN UNDERSTANDING.      USED # [id number]    BP (!) 149/86   Pulse (!) 142   Temp 37.5 °C (99.5 °F) (Temporal)   Resp 16   Ht 1.651 m (5' 5\")   Wt 65.5 kg (144 lb 6.4 oz)   SpO2 97%   BMI 24.03 kg/m²     "

## 2023-04-24 ENCOUNTER — APPOINTMENT (OUTPATIENT)
Dept: RADIOLOGY | Facility: MEDICAL CENTER | Age: 28
DRG: 512 | End: 2023-04-24
Attending: STUDENT IN AN ORGANIZED HEALTH CARE EDUCATION/TRAINING PROGRAM
Payer: COMMERCIAL

## 2023-04-24 ENCOUNTER — HOSPITAL ENCOUNTER (INPATIENT)
Facility: MEDICAL CENTER | Age: 28
LOS: 2 days | DRG: 512 | End: 2023-04-26
Attending: EMERGENCY MEDICINE | Admitting: STUDENT IN AN ORGANIZED HEALTH CARE EDUCATION/TRAINING PROGRAM
Payer: COMMERCIAL

## 2023-04-24 ENCOUNTER — APPOINTMENT (OUTPATIENT)
Dept: RADIOLOGY | Facility: MEDICAL CENTER | Age: 28
DRG: 512 | End: 2023-04-24
Payer: COMMERCIAL

## 2023-04-24 DIAGNOSIS — S62.101A CLOSED FRACTURE OF RIGHT WRIST, INITIAL ENCOUNTER: ICD-10-CM

## 2023-04-24 PROBLEM — D72.829 LEUKOCYTOSIS: Status: ACTIVE | Noted: 2023-04-24

## 2023-04-24 PROBLEM — S52.511A: Status: ACTIVE | Noted: 2023-04-24

## 2023-04-24 LAB
ALBUMIN SERPL BCP-MCNC: 4.5 G/DL (ref 3.2–4.9)
ALBUMIN/GLOB SERPL: 1.7 G/DL
ALP SERPL-CCNC: 70 U/L (ref 30–99)
ALT SERPL-CCNC: 31 U/L (ref 2–50)
ANION GAP SERPL CALC-SCNC: 13 MMOL/L (ref 7–16)
AST SERPL-CCNC: 27 U/L (ref 12–45)
BASOPHILS # BLD AUTO: 0.2 % (ref 0–1.8)
BASOPHILS # BLD: 0.03 K/UL (ref 0–0.12)
BILIRUB SERPL-MCNC: 0.2 MG/DL (ref 0.1–1.5)
BUN SERPL-MCNC: 16 MG/DL (ref 8–22)
CALCIUM ALBUM COR SERPL-MCNC: 8.6 MG/DL (ref 8.5–10.5)
CALCIUM SERPL-MCNC: 9 MG/DL (ref 8.5–10.5)
CHLORIDE SERPL-SCNC: 106 MMOL/L (ref 96–112)
CO2 SERPL-SCNC: 20 MMOL/L (ref 20–33)
CREAT SERPL-MCNC: 0.91 MG/DL (ref 0.5–1.4)
EOSINOPHIL # BLD AUTO: 0.06 K/UL (ref 0–0.51)
EOSINOPHIL NFR BLD: 0.4 % (ref 0–6.9)
ERYTHROCYTE [DISTWIDTH] IN BLOOD BY AUTOMATED COUNT: 43.6 FL (ref 35.9–50)
GFR SERPLBLD CREATININE-BSD FMLA CKD-EPI: 118 ML/MIN/1.73 M 2
GLOBULIN SER CALC-MCNC: 2.7 G/DL (ref 1.9–3.5)
GLUCOSE SERPL-MCNC: 100 MG/DL (ref 65–99)
HCT VFR BLD AUTO: 46.7 % (ref 42–52)
HGB BLD-MCNC: 15.8 G/DL (ref 14–18)
IMM GRANULOCYTES # BLD AUTO: 0.1 K/UL (ref 0–0.11)
IMM GRANULOCYTES NFR BLD AUTO: 0.6 % (ref 0–0.9)
LYMPHOCYTES # BLD AUTO: 1.61 K/UL (ref 1–4.8)
LYMPHOCYTES NFR BLD: 9.8 % (ref 22–41)
MCH RBC QN AUTO: 29.9 PG (ref 27–33)
MCHC RBC AUTO-ENTMCNC: 33.8 G/DL (ref 33.7–35.3)
MCV RBC AUTO: 88.4 FL (ref 81.4–97.8)
MONOCYTES # BLD AUTO: 1.25 K/UL (ref 0–0.85)
MONOCYTES NFR BLD AUTO: 7.6 % (ref 0–13.4)
NEUTROPHILS # BLD AUTO: 13.31 K/UL (ref 1.82–7.42)
NEUTROPHILS NFR BLD: 81.4 % (ref 44–72)
NRBC # BLD AUTO: 0 K/UL
NRBC BLD-RTO: 0 /100 WBC
PLATELET # BLD AUTO: 263 K/UL (ref 164–446)
PMV BLD AUTO: 9.8 FL (ref 9–12.9)
POTASSIUM SERPL-SCNC: 4.1 MMOL/L (ref 3.6–5.5)
PROT SERPL-MCNC: 7.2 G/DL (ref 6–8.2)
RBC # BLD AUTO: 5.28 M/UL (ref 4.7–6.1)
SODIUM SERPL-SCNC: 139 MMOL/L (ref 135–145)
WBC # BLD AUTO: 16.4 K/UL (ref 4.8–10.8)

## 2023-04-24 PROCEDURE — 29075 APPL CST ELBW FNGR SHORT ARM: CPT

## 2023-04-24 PROCEDURE — 73200 CT UPPER EXTREMITY W/O DYE: CPT | Mod: RT

## 2023-04-24 PROCEDURE — 302874 HCHG BANDAGE ACE 2 OR 3""

## 2023-04-24 PROCEDURE — 96375 TX/PRO/DX INJ NEW DRUG ADDON: CPT

## 2023-04-24 PROCEDURE — 96374 THER/PROPH/DIAG INJ IV PUSH: CPT

## 2023-04-24 PROCEDURE — 99223 1ST HOSP IP/OBS HIGH 75: CPT | Mod: AI | Performed by: STUDENT IN AN ORGANIZED HEALTH CARE EDUCATION/TRAINING PROGRAM

## 2023-04-24 PROCEDURE — 700111 HCHG RX REV CODE 636 W/ 250 OVERRIDE (IP): Performed by: EMERGENCY MEDICINE

## 2023-04-24 PROCEDURE — 29125 APPL SHORT ARM SPLINT STATIC: CPT

## 2023-04-24 PROCEDURE — 85025 COMPLETE CBC W/AUTO DIFF WBC: CPT

## 2023-04-24 PROCEDURE — 96376 TX/PRO/DX INJ SAME DRUG ADON: CPT

## 2023-04-24 PROCEDURE — 73090 X-RAY EXAM OF FOREARM: CPT | Mod: RT

## 2023-04-24 PROCEDURE — A9270 NON-COVERED ITEM OR SERVICE: HCPCS | Performed by: STUDENT IN AN ORGANIZED HEALTH CARE EDUCATION/TRAINING PROGRAM

## 2023-04-24 PROCEDURE — 36415 COLL VENOUS BLD VENIPUNCTURE: CPT

## 2023-04-24 PROCEDURE — 770001 HCHG ROOM/CARE - MED/SURG/GYN PRIV*

## 2023-04-24 PROCEDURE — 700105 HCHG RX REV CODE 258: Performed by: EMERGENCY MEDICINE

## 2023-04-24 PROCEDURE — 73110 X-RAY EXAM OF WRIST: CPT | Mod: RT

## 2023-04-24 PROCEDURE — 99285 EMERGENCY DEPT VISIT HI MDM: CPT

## 2023-04-24 PROCEDURE — 80053 COMPREHEN METABOLIC PANEL: CPT

## 2023-04-24 PROCEDURE — 700102 HCHG RX REV CODE 250 W/ 637 OVERRIDE(OP): Performed by: STUDENT IN AN ORGANIZED HEALTH CARE EDUCATION/TRAINING PROGRAM

## 2023-04-24 RX ORDER — ONDANSETRON 2 MG/ML
4 INJECTION INTRAMUSCULAR; INTRAVENOUS ONCE
Status: COMPLETED | OUTPATIENT
Start: 2023-04-24 | End: 2023-04-24

## 2023-04-24 RX ORDER — OXYCODONE HYDROCHLORIDE 10 MG/1
10 TABLET ORAL
Status: DISCONTINUED | OUTPATIENT
Start: 2023-04-24 | End: 2023-04-26 | Stop reason: HOSPADM

## 2023-04-24 RX ORDER — OXYCODONE HYDROCHLORIDE 5 MG/1
5 TABLET ORAL
Status: DISCONTINUED | OUTPATIENT
Start: 2023-04-24 | End: 2023-04-26 | Stop reason: HOSPADM

## 2023-04-24 RX ORDER — OXYCODONE HYDROCHLORIDE 5 MG/1
2.5 TABLET ORAL
Status: DISCONTINUED | OUTPATIENT
Start: 2023-04-24 | End: 2023-04-24

## 2023-04-24 RX ORDER — ACETAMINOPHEN 500 MG
1000 TABLET ORAL EVERY 6 HOURS PRN
Status: DISCONTINUED | OUTPATIENT
Start: 2023-04-30 | End: 2023-04-26 | Stop reason: HOSPADM

## 2023-04-24 RX ORDER — CELECOXIB 200 MG/1
200 CAPSULE ORAL 2 TIMES DAILY
Status: DISCONTINUED | OUTPATIENT
Start: 2023-04-24 | End: 2023-04-24

## 2023-04-24 RX ORDER — MORPHINE SULFATE 4 MG/ML
4 INJECTION INTRAVENOUS ONCE
Status: COMPLETED | OUTPATIENT
Start: 2023-04-24 | End: 2023-04-24

## 2023-04-24 RX ORDER — HYDROMORPHONE HYDROCHLORIDE 1 MG/ML
0.25 INJECTION, SOLUTION INTRAMUSCULAR; INTRAVENOUS; SUBCUTANEOUS
Status: DISCONTINUED | OUTPATIENT
Start: 2023-04-24 | End: 2023-04-24

## 2023-04-24 RX ORDER — IBUPROFEN 400 MG/1
400 TABLET ORAL EVERY 6 HOURS PRN
Status: DISCONTINUED | OUTPATIENT
Start: 2023-04-24 | End: 2023-04-26 | Stop reason: HOSPADM

## 2023-04-24 RX ORDER — HYDROMORPHONE HYDROCHLORIDE 1 MG/ML
0.5 INJECTION, SOLUTION INTRAMUSCULAR; INTRAVENOUS; SUBCUTANEOUS
Status: DISCONTINUED | OUTPATIENT
Start: 2023-04-24 | End: 2023-04-26 | Stop reason: HOSPADM

## 2023-04-24 RX ORDER — OXYCODONE HYDROCHLORIDE 5 MG/1
5 TABLET ORAL
Status: DISCONTINUED | OUTPATIENT
Start: 2023-04-24 | End: 2023-04-24

## 2023-04-24 RX ORDER — ACETAMINOPHEN 500 MG
1000 TABLET ORAL EVERY 6 HOURS
Status: DISCONTINUED | OUTPATIENT
Start: 2023-04-25 | End: 2023-04-26 | Stop reason: HOSPADM

## 2023-04-24 RX ORDER — SODIUM CHLORIDE, SODIUM LACTATE, POTASSIUM CHLORIDE, CALCIUM CHLORIDE 600; 310; 30; 20 MG/100ML; MG/100ML; MG/100ML; MG/100ML
INJECTION, SOLUTION INTRAVENOUS CONTINUOUS
Status: DISCONTINUED | OUTPATIENT
Start: 2023-04-24 | End: 2023-04-26 | Stop reason: HOSPADM

## 2023-04-24 RX ORDER — CELECOXIB 200 MG/1
200 CAPSULE ORAL 2 TIMES DAILY PRN
Status: DISCONTINUED | OUTPATIENT
Start: 2023-04-29 | End: 2023-04-24

## 2023-04-24 RX ADMIN — MORPHINE SULFATE 4 MG: 4 INJECTION, SOLUTION INTRAMUSCULAR; INTRAVENOUS at 18:55

## 2023-04-24 RX ADMIN — SODIUM CHLORIDE, POTASSIUM CHLORIDE, SODIUM LACTATE AND CALCIUM CHLORIDE 1000 ML: 600; 310; 30; 20 INJECTION, SOLUTION INTRAVENOUS at 22:51

## 2023-04-24 RX ADMIN — OXYCODONE 5 MG: 5 TABLET ORAL at 22:41

## 2023-04-24 RX ADMIN — ONDANSETRON HYDROCHLORIDE 4 MG: 2 SOLUTION INTRAMUSCULAR; INTRAVENOUS at 18:55

## 2023-04-24 RX ADMIN — SODIUM CHLORIDE, POTASSIUM CHLORIDE, SODIUM LACTATE AND CALCIUM CHLORIDE: 600; 310; 30; 20 INJECTION, SOLUTION INTRAVENOUS at 18:55

## 2023-04-24 RX ADMIN — ACETAMINOPHEN 1000 MG: 500 TABLET, FILM COATED ORAL at 23:52

## 2023-04-24 RX ADMIN — MORPHINE SULFATE 4 MG: 4 INJECTION, SOLUTION INTRAMUSCULAR; INTRAVENOUS at 20:04

## 2023-04-24 ASSESSMENT — COGNITIVE AND FUNCTIONAL STATUS - GENERAL
TOILETING: A LITTLE
STANDING UP FROM CHAIR USING ARMS: A LITTLE
MOBILITY SCORE: 18
DRESSING REGULAR LOWER BODY CLOTHING: A LITTLE
SUGGESTED CMS G CODE MODIFIER MOBILITY: CK
WALKING IN HOSPITAL ROOM: A LITTLE
DRESSING REGULAR UPPER BODY CLOTHING: A LITTLE
MOVING FROM LYING ON BACK TO SITTING ON SIDE OF FLAT BED: A LITTLE
SUGGESTED CMS G CODE MODIFIER DAILY ACTIVITY: CK
DAILY ACTIVITIY SCORE: 18
PERSONAL GROOMING: A LITTLE
TURNING FROM BACK TO SIDE WHILE IN FLAT BAD: A LITTLE
CLIMB 3 TO 5 STEPS WITH RAILING: A LITTLE
EATING MEALS: A LITTLE
MOVING TO AND FROM BED TO CHAIR: A LITTLE
HELP NEEDED FOR BATHING: A LITTLE

## 2023-04-24 ASSESSMENT — LIFESTYLE VARIABLES
EVER FELT BAD OR GUILTY ABOUT YOUR DRINKING: NO
SUBSTANCE_ABUSE: 0
TOTAL SCORE: 0
ALCOHOL_USE: NO
HAVE PEOPLE ANNOYED YOU BY CRITICIZING YOUR DRINKING: NO
TOTAL SCORE: 0
AVERAGE NUMBER OF DAYS PER WEEK YOU HAVE A DRINK CONTAINING ALCOHOL: 0
ON A TYPICAL DAY WHEN YOU DRINK ALCOHOL HOW MANY DRINKS DO YOU HAVE: 0
HAVE YOU EVER FELT YOU SHOULD CUT DOWN ON YOUR DRINKING: NO
DOES PATIENT WANT TO STOP DRINKING: CANNOT ASSESS
HOW MANY TIMES IN THE PAST YEAR HAVE YOU HAD 5 OR MORE DRINKS IN A DAY: 0
CONSUMPTION TOTAL: NEGATIVE
TOTAL SCORE: 0
EVER HAD A DRINK FIRST THING IN THE MORNING TO STEADY YOUR NERVES TO GET RID OF A HANGOVER: NO

## 2023-04-24 ASSESSMENT — PAIN DESCRIPTION - PAIN TYPE: TYPE: ACUTE PAIN

## 2023-04-24 ASSESSMENT — ENCOUNTER SYMPTOMS
BLURRED VISION: 0
HEADACHES: 0
VOMITING: 0
PHOTOPHOBIA: 0
SENSORY CHANGE: 0
ABDOMINAL PAIN: 0
COUGH: 0
NAUSEA: 0
SORE THROAT: 0
DEPRESSION: 0
SINUS PAIN: 0
DIZZINESS: 0
SHORTNESS OF BREATH: 0
PALPITATIONS: 0
CHILLS: 0
FEVER: 0
DOUBLE VISION: 0
WHEEZING: 0

## 2023-04-24 ASSESSMENT — PATIENT HEALTH QUESTIONNAIRE - PHQ9
SUM OF ALL RESPONSES TO PHQ9 QUESTIONS 1 AND 2: 0
1. LITTLE INTEREST OR PLEASURE IN DOING THINGS: NOT AT ALL
2. FEELING DOWN, DEPRESSED, IRRITABLE, OR HOPELESS: NOT AT ALL

## 2023-04-24 ASSESSMENT — FIBROSIS 4 INDEX: FIB4 SCORE: 0.52

## 2023-04-24 NOTE — LETTER
"  FORM C-4:  EMPLOYEE’S CLAIM FOR COMPENSATION/ REPORT OF INITIAL TREATMENT  EMPLOYEE’S CLAIM - PROVIDE ALL INFORMATION REQUESTED   First Name Gilmar Last Name Tommy Birthdate 1995  Sex male Claim Number   Home Address [address]   [facility]             Zip [address]                                   Age  28 y.o. 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 (if applicable) [employer]    How did this injury or occupational disease occur? Be specific and answer in detail. Use additional sheet if necessary)  ESTABA SUBIENDO CON MATERIAL SOBRE LE HOMBRO CUANDO LA AYDIN SE DESLIGO SOBRE LA PARED   If you believe that you have an occupational disease, when did you first have knowledge of the disability and it relationship to your employment? N/A Witnesses to the Accident  ALEXY RITCHIE   Nature of Injury or Occupational Disease  Workers' Compensation Part(s) of Body Injured or Affected  Hand (R), N/A, N/A    I CERTIFY THAT THE ABOVE IS TRUE AND CORRECT TO THE BEST OF MY KNOWLEDGE AND THAT I HAVE PROVIDED THIS INFORMATION IN ORDER TO OBTAIN THE BENEFITS OF [place]  I HEREBY " AUTHORIZE ANY PHYSICIAN, CHIROPRACTOR, SURGEON, PRACTITIONER, OR OTHER PERSON, ANY HOSPITAL, INCLUDING [facility] OR [facility], ANY MEDICAL SERVICE ORGANIZATION, ANY INSURANCE COMPANY, OR OTHER INSTITUTION OR ORGANIZATION TO RELEASE TO EACH OTHER, ANY MEDICAL OR OTHER INFORMATION, INCLUDING BENEFITS PAID OR PAYABLE, PERTINENT TO THIS INJURY OR DISEASE, EXCEPT INFORMATION RELATIVE TO DIAGNOSIS, TREATMENT AND/OR COUNSELING FOR AIDS, PSYCHOLOGICAL CONDITIONS, ALCOHOL OR CONTROLLED SUBSTANCES, FOR WHICH I MUST GIVE SPECIFIC AUTHORIZATION.  A PHOTOSTAT OF THIS AUTHORIZATION SHALL BE AS VALID AS THE ORIGINAL.  Date     04/24/2023           Place          [facility]                         Employee’s Signature   THIS REPORT MUST BE COMPLETED AND MAILED WITHIN 3 WORKING DAYS OF TREATMENT   Place [facility], EMERGENCY DEPT                       Name of Facility [facility]   Date  4/24/2023 Diagnosis  No diagnosis found. Is there evidence the injured employee was under the influence of alcohol and/or another controlled substance at the time of accident?   Hour  8:02 PM Description of Injury or Disease   No   Treatment  Surgery  Have you advised the patient to remain off work five days or more?         Yes   X-Ray Findings  Positive  Comments:Distal radius fracture requiring surgery If Yes   From Date    To Date      From information given by the employee, together with medical evidence, can you directly connect this injury or occupational disease as job incurred? Yes If No, is employee capable of: Full Duty  No Modified Duty      Is additional medical care by a physician indicated? Yes If Modified Duty, Specify any Limitations / Restrictions       Do you know of any previous injury or disease contributing to this condition or occupational disease? No    Date 4/24/2023 Print Doctor’s Name Reymundo Garcia I certify the employer’s copy of this form was mailed on:  "  Address [address] [place] [id number] [phone] INSURER’S USE ONLY   Provider’s Tax ID Number [id number] Telephone Dept: [phone]    Doctor’s Signature e-SignDE MORENITA NAZARIO M.D., MD      Form C-4 (rev.10/07)                                                                         BRIEF DESCRIPTION OF RIGHTS AND BENEFITS  (Pursuant to [place] 616[place].050)    Notice of Injury or Occupational Disease (Incident Report Form C-1): If an injury or occupational disease (OD) arises out of and in the course of employment, you must provide written notice to your employer as soon as practicable, but no later than 7 days after the accident or OD. Your employer shall maintain a sufficient supply of the required forms.    Claim for Compensation (Form C-4): If medical treatment is sought, the form C-4 is available at the place of initial treatment. A completed \"Claim for Compensation\" (Form C-4) must be filed within 90 days after an accident or OD. The treating physician or chiropractor must, within 3 working days after treatment, complete and mail to the employer, the employer's insurer and third-party , the Claim for Compensation.    Medical Treatment: If you require medical treatment for your on-the-job injury or OD, you may be required to select a physician or chiropractor from a list provided by your workers’ compensation insurer, if it has contracted with an Organization for Managed Care (MCO) or Preferred Provider Organization (PPO) or providers of health care. If your employer has not entered into a contract with an MCO or PPO, you may select a physician or chiropractor from the Panel of Physicians and Chiropractors. Any medical costs related to your industrial injury or OD will be paid by your insurer.    Temporary Total Disability (TTD): If your doctor has certified that you are unable to work for a period of at least 5 consecutive days, or 5 cumulative days in a 20-day period, or places " restrictions on you that your employer does not accommodate, you may be entitled to TTD compensation.    Temporary Partial Disability (TPD): If the wage you receive upon reemployment is less than the compensation for TTD to which you are entitled, the insurer may be required to pay you TPD compensation to make up the difference. TPD can only be paid for a maximum of 24 months.    Permanent Partial Disability (PPD): When your medical condition is stable and there is an indication of a PPD as a result of your injury or OD, within 30 days, your insurer must arrange for an evaluation by a rating physician or chiropractor to determine the degree of your PPD. The amount of your PPD award depends on the date of injury, the results of the PPD evaluation, your age and wage.    Permanent Total Disability (PTD): If you are medically certified by a treating physician or chiropractor as permanently and totally disabled and have been granted a PTD status by your insurer, you are entitled to receive monthly benefits not to exceed 66 2/3% of your average monthly wage. The amount of your PTD payments is subject to reduction if you previously received a lump-sum PPD award.    Vocational Rehabilitation Services: You may be eligible for vocational rehabilitation services if you are unable to return to the job due to a permanent physical impairment or permanent restrictions as a result of your injury or occupational disease.    Transportation and Per Margarito Reimbursement: You may be eligible for travel expenses and per margarito associated with medical treatment.    Reopening: You may be able to reopen your claim if your condition worsens after claim closure.     Appeal Process: If you disagree with a written determination issued by the insurer or the insurer does not respond to your request, you may appeal to the Department of Administration, , by following the instructions contained in your determination letter. You must  appeal the determination within 70 days from the date of the determination letter at [address], [address], [place], [place] [address], or [address], [address], [place], [place] [address]. If you disagree with the  decision, you may appeal to the Department of Administration, . You must file your appeal within 30 days from the date of the  decision letter at [address], [address], [place], [place] [address], or [address], [address], [place], [place] [address]. If you disagree with a decision of an , you may file a petition for judicial review with the District Court. You must do so within 30 days of the Appeal Officer’s decision. You may be represented by an  at your own expense or you may contact the [facility] for possible representation.    [place]  for Injured Workers (NAIW): If you disagree with a  decision, you may request that NAIW represent you without charge at an  Hearing. For information regarding denial of benefits, you may contact the [facility] at: [address], [address], [place] [address], [phone], or [address], [address], [place] [address], [phone]    To File a Complaint with the Division: If you wish to file a complaint with the  of the Division of Industrial Relations (DIR),  please contact the Workers’ Compensation Section, [address], [address], [place], [place] [address], telephone [phone], or [address], [address], [place], [place] [address], telephone [phone].    For assistance with Workers’ Compensation Issues: You may contact the [facility] Office for Consumer Health Assistance, [address] [place], [address], [place], [place] [address], Toll Free 1-648.941.8153, Web site: http://[facility].[place].gov/Programs/CHIN E-mail: chin@[facility].nv.gov  D-2 (rev. 10/20)               __________________________________________________________________                                    __04/24/2023____            Employee Name / Signature                                                                                                                            Date

## 2023-04-24 NOTE — LETTER
"  FORM C-4:  EMPLOYEE’S CLAIM FOR COMPENSATION/ REPORT OF INITIAL TREATMENT  EMPLOYEE’S CLAIM - PROVIDE ALL INFORMATION REQUESTED   First Name Gilmar Last Name Tommy Birthdate 1995  Sex male Claim Number   Home Address [address]   [facility]             Zip [address]                                   Age  28 y.o. 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 (if applicable) TRABAJANDO    How did this injury or occupational disease occur? Be specific and answer in detail. Use additional sheet if necessary)  ESTABA SUBIENDO CON MATERIAL SOBRE LE HOMBRO CUANDO LA AYDIN SE DESLIGO SOBRE LA PARED   If you believe that you have an occupational disease, when did you first have knowledge of the disability and it relationship to your employment? N/A Witnesses to the Accident  ALEXY DUGAN   Nature of Injury or Occupational Disease  Workers' Compensation Part(s) of Body Injured or Affected  Hand (R), N/A, N/A    I CERTIFY THAT THE ABOVE IS TRUE AND CORRECT TO THE BEST OF MY KNOWLEDGE AND THAT I HAVE PROVIDED THIS INFORMATION IN ORDER TO OBTAIN THE BENEFITS OF [place] OR " [place]).  I HEREBY AUTHORIZE ANY PHYSICIAN, CHIROPRACTOR, SURGEON, PRACTITIONER, OR OTHER PERSON, ANY HOSPITAL, INCLUDING [facility] OR [facility], ANY MEDICAL SERVICE ORGANIZATION, ANY INSURANCE COMPANY, OR OTHER INSTITUTION OR ORGANIZATION TO RELEASE TO EACH OTHER, ANY MEDICAL OR OTHER INFORMATION, INCLUDING BENEFITS PAID OR PAYABLE, PERTINENT TO THIS INJURY OR DISEASE, EXCEPT INFORMATION RELATIVE TO DIAGNOSIS, TREATMENT AND/OR COUNSELING FOR AIDS, PSYCHOLOGICAL CONDITIONS, ALCOHOL OR CONTROLLED SUBSTANCES, FOR WHICH I MUST GIVE SPECIFIC AUTHORIZATION.  A PHOTOSTAT OF THIS AUTHORIZATION SHALL BE AS VALID AS THE ORIGINAL.  Date                                      Place                                                                             Employee’s Signature   THIS REPORT MUST BE COMPLETED AND MAILED WITHIN 3 WORKING DAYS OF TREATMENT   Place [facility], EMERGENCY DEPT                       Name of Facility [facility]   Date  4/24/2023 Diagnosis  No diagnosis found. Is there evidence the injured employee was under the influence of alcohol and/or another controlled substance at the time of accident?   Hour  7:46 PM Description of Injury or Disease       Treatment     Have you advised the patient to remain off work five days or more?             X-Ray Findings    If Yes   From Date    To Date      From information given by the employee, together with medical evidence, can you directly connect this injury or occupational disease as job incurred?   If No, is employee capable of: Full Duty    Modified Duty      Is additional medical care by a physician indicated?   If Modified Duty, Specify any Limitations / Restrictions       Do you know of any previous injury or disease contributing to this condition or occupational disease?      Date 4/24/2023 Print Doctor’s Name Reymundo Garcia I certify the employer’s copy of this form was mailed  "on:   Address [address] [place] [address]  [place] [address]  [phone] INSURER’S USE ONLY   Provider’s Tax ID Number [id number] Telephone Dept: [phone]    Doctor’s Signature   Degree        Form C-4 (rev.10/07)                                                                         BRIEF DESCRIPTION OF RIGHTS AND BENEFITS  (Pursuant to [place] 616C.050)    Notice of Injury or Occupational Disease (Incident Report Form C-1): If an injury or occupational disease (OD) arises out of and in the course of employment, you must provide written notice to your employer as soon as practicable, but no later than 7 days after the accident or OD. Your employer shall maintain a sufficient supply of the required forms.    Claim for Compensation (Form C-4): If medical treatment is sought, the form C-4 is available at the place of initial treatment. A completed \"Claim for Compensation\" (Form C-4) must be filed within 90 days after an accident or OD. The treating physician or chiropractor must, within 3 working days after treatment, complete and mail to the employer, the employer's insurer and third-party , the Claim for Compensation.    Medical Treatment: If you require medical treatment for your on-the-job injury or OD, you may be required to select a physician or chiropractor from a list provided by your workers’ compensation insurer, if it has contracted with an Organization for Managed Care (MCO) or Preferred Provider Organization (PPO) or providers of health care. If your employer has not entered into a contract with an MCO or PPO, you may select a physician or chiropractor from the Panel of Physicians and Chiropractors. Any medical costs related to your industrial injury or OD will be paid by your insurer.    Temporary Total Disability (TTD): If your doctor has certified that you are unable to work for a period of at least 5 consecutive days, or 5 cumulative days in a 20-day period, or places restrictions on you that " your employer does not accommodate, you may be entitled to TTD compensation.    Temporary Partial Disability (TPD): If the wage you receive upon reemployment is less than the compensation for TTD to which you are entitled, the insurer may be required to pay you TPD compensation to make up the difference. TPD can only be paid for a maximum of 24 months.    Permanent Partial Disability (PPD): When your medical condition is stable and there is an indication of a PPD as a result of your injury or OD, within 30 days, your insurer must arrange for an evaluation by a rating physician or chiropractor to determine the degree of your PPD. The amount of your PPD award depends on the date of injury, the results of the PPD evaluation, your age and wage.    Permanent Total Disability (PTD): If you are medically certified by a treating physician or chiropractor as permanently and totally disabled and have been granted a PTD status by your insurer, you are entitled to receive monthly benefits not to exceed 66 2/3% of your average monthly wage. The amount of your PTD payments is subject to reduction if you previously received a lump-sum PPD award.    Vocational Rehabilitation Services: You may be eligible for vocational rehabilitation services if you are unable to return to the job due to a permanent physical impairment or permanent restrictions as a result of your injury or occupational disease.    Transportation and Per Margarito Reimbursement: You may be eligible for travel expenses and per margarito associated with medical treatment.    Reopening: You may be able to reopen your claim if your condition worsens after claim closure.     Appeal Process: If you disagree with a written determination issued by the insurer or the insurer does not respond to your request, you may appeal to the Department of Administration, , by following the instructions contained in your determination letter. You must appeal the determination  within 70 days from the date of the determination letter at [address], [address], [place], [place] [address], or [address], [address], [place], [place] [address]. If you disagree with the  decision, you may appeal to the Department of Administration, . You must file your appeal within 30 days from the date of the  decision letter at [address], [address], [place], [place] [address], or [address], [address], [place], [place] [address]. If you disagree with a decision of an , you may file a petition for judicial review with the District Court. You must do so within 30 days of the Appeal Officer’s decision. You may be represented by an  at your own expense or you may contact the [facility] for possible representation.    [place]  for Injured Workers (NAIW): If you disagree with a  decision, you may request that NAIW represent you without charge at an  Hearing. For information regarding denial of benefits, you may contact the [facility] at: [address], [address], [place] [address], [phone], or [address], [address], [place] [address], [phone]    To File a Complaint with the Division: If you wish to file a complaint with the  of the Division of Industrial Relations (DIR),  please contact the Workers’ Compensation Section, [address], [address], [place], [place] [address], telephone [phone], or [address], [address], [place], [place] [address], telephone [phone].    For assistance with Workers’ Compensation Issues: You may contact the [facility] Office for Consumer Health Assistance, [address], [address], [place], [place] [address], Toll Free 1-402.549.9413, Web site: http://[facility].[place].gov/Programs/CHIN E-mail: chin@[facility].[place].gov  D-2 (rev. 10/20)               __________________________________________________________________                                    _________________            Employee Name / Signature                                                                                                                            Date

## 2023-04-25 ENCOUNTER — APPOINTMENT (OUTPATIENT)
Dept: RADIOLOGY | Facility: MEDICAL CENTER | Age: 28
DRG: 512 | End: 2023-04-25
Attending: STUDENT IN AN ORGANIZED HEALTH CARE EDUCATION/TRAINING PROGRAM
Payer: COMMERCIAL

## 2023-04-25 ENCOUNTER — ANESTHESIA (OUTPATIENT)
Dept: SURGERY | Facility: MEDICAL CENTER | Age: 28
DRG: 512 | End: 2023-04-25
Payer: COMMERCIAL

## 2023-04-25 ENCOUNTER — ANESTHESIA EVENT (OUTPATIENT)
Dept: SURGERY | Facility: MEDICAL CENTER | Age: 28
DRG: 512 | End: 2023-04-25
Payer: COMMERCIAL

## 2023-04-25 PROCEDURE — 160002 HCHG RECOVERY MINUTES (STAT): Performed by: STUDENT IN AN ORGANIZED HEALTH CARE EDUCATION/TRAINING PROGRAM

## 2023-04-25 PROCEDURE — 25609 OPTX DST RD XART FX/EP SEP3+: CPT | Mod: 29SX,RT | Performed by: STUDENT IN AN ORGANIZED HEALTH CARE EDUCATION/TRAINING PROGRAM

## 2023-04-25 PROCEDURE — 73110 X-RAY EXAM OF WRIST: CPT | Mod: RT

## 2023-04-25 PROCEDURE — 160048 HCHG OR STATISTICAL LEVEL 1-5: Performed by: STUDENT IN AN ORGANIZED HEALTH CARE EDUCATION/TRAINING PROGRAM

## 2023-04-25 PROCEDURE — 64415 NJX AA&/STRD BRCH PLXS IMG: CPT | Performed by: STUDENT IN AN ORGANIZED HEALTH CARE EDUCATION/TRAINING PROGRAM

## 2023-04-25 PROCEDURE — 160028 HCHG SURGERY MINUTES - 1ST 30 MINS LEVEL 3: Performed by: STUDENT IN AN ORGANIZED HEALTH CARE EDUCATION/TRAINING PROGRAM

## 2023-04-25 PROCEDURE — 25609 OPTX DST RD XART FX/EP SEP3+: CPT | Mod: RT | Performed by: STUDENT IN AN ORGANIZED HEALTH CARE EDUCATION/TRAINING PROGRAM

## 2023-04-25 PROCEDURE — A9270 NON-COVERED ITEM OR SERVICE: HCPCS | Performed by: STUDENT IN AN ORGANIZED HEALTH CARE EDUCATION/TRAINING PROGRAM

## 2023-04-25 PROCEDURE — 01830 ANES ARTHR/NDSC WRST/HND NOS: CPT | Performed by: ANESTHESIOLOGY

## 2023-04-25 PROCEDURE — C1713 ANCHOR/SCREW BN/BN,TIS/BN: HCPCS | Performed by: STUDENT IN AN ORGANIZED HEALTH CARE EDUCATION/TRAINING PROGRAM

## 2023-04-25 PROCEDURE — 0PSH04Z REPOSITION RIGHT RADIUS WITH INTERNAL FIXATION DEVICE, OPEN APPROACH: ICD-10-PCS | Performed by: STUDENT IN AN ORGANIZED HEALTH CARE EDUCATION/TRAINING PROGRAM

## 2023-04-25 PROCEDURE — 700105 HCHG RX REV CODE 258: Performed by: EMERGENCY MEDICINE

## 2023-04-25 PROCEDURE — 700111 HCHG RX REV CODE 636 W/ 250 OVERRIDE (IP): Performed by: ANESTHESIOLOGY

## 2023-04-25 PROCEDURE — 160009 HCHG ANES TIME/MIN: Performed by: STUDENT IN AN ORGANIZED HEALTH CARE EDUCATION/TRAINING PROGRAM

## 2023-04-25 PROCEDURE — 160035 HCHG PACU - 1ST 60 MINS PHASE I: Performed by: STUDENT IN AN ORGANIZED HEALTH CARE EDUCATION/TRAINING PROGRAM

## 2023-04-25 PROCEDURE — 99254 IP/OBS CNSLTJ NEW/EST MOD 60: CPT | Mod: 57 | Performed by: STUDENT IN AN ORGANIZED HEALTH CARE EDUCATION/TRAINING PROGRAM

## 2023-04-25 PROCEDURE — 770001 HCHG ROOM/CARE - MED/SURG/GYN PRIV*

## 2023-04-25 PROCEDURE — 700111 HCHG RX REV CODE 636 W/ 250 OVERRIDE (IP): Performed by: STUDENT IN AN ORGANIZED HEALTH CARE EDUCATION/TRAINING PROGRAM

## 2023-04-25 PROCEDURE — 700102 HCHG RX REV CODE 250 W/ 637 OVERRIDE(OP): Performed by: STUDENT IN AN ORGANIZED HEALTH CARE EDUCATION/TRAINING PROGRAM

## 2023-04-25 PROCEDURE — 160039 HCHG SURGERY MINUTES - EA ADDL 1 MIN LEVEL 3: Performed by: STUDENT IN AN ORGANIZED HEALTH CARE EDUCATION/TRAINING PROGRAM

## 2023-04-25 PROCEDURE — 64415 NJX AA&/STRD BRCH PLXS IMG: CPT | Mod: RT,59 | Performed by: ANESTHESIOLOGY

## 2023-04-25 DEVICE — SCREW BONE VARIAX T8 FULL THREAD L18 MM OD2.7 MM FOOT ANKLE LOCK STARDRIVE NONSTERILE: Type: IMPLANTABLE DEVICE | Site: ARM | Status: FUNCTIONAL

## 2023-04-25 DEVICE — SCREW BONE VARIAX T8 FULL THREAD L16 MM OD2.7 MM FOOT ANKLE LOCK STARDRIVE NONSTERILE: Type: IMPLANTABLE DEVICE | Site: ARM | Status: FUNCTIONAL

## 2023-04-25 DEVICE — BONE SCREW T8 FT 2.7MM  L32MM: Type: IMPLANTABLE DEVICE | Site: ARM | Status: FUNCTIONAL

## 2023-04-25 DEVICE — SCREW BONE VARIAX T8 FULL THREAD L20 MM OD2.7 MM FOOT ANKLE LOCK STARDRIVE NONSTERILE: Type: IMPLANTABLE DEVICE | Site: ARM | Status: FUNCTIONAL

## 2023-04-25 DEVICE — SCREW BONE VARIAX T8 FULL THREAD L14 MM OD2.7 MM FOOT ANKLE STARDRIVE NONSTERILE: Type: IMPLANTABLE DEVICE | Site: ARM | Status: FUNCTIONAL

## 2023-04-25 DEVICE — SCREW BONE T8 FULL THREAD L22 MM OD2.7 MM LOCK STARDRIVE NONSTERILE VARIAX FOOT PLATE SYSTEM: Type: IMPLANTABLE DEVICE | Site: ARM | Status: FUNCTIONAL

## 2023-04-25 DEVICE — BONE SCREW T8 FT 2.7MM  L30MM: Type: IMPLANTABLE DEVICE | Site: ARM | Status: FUNCTIONAL

## 2023-04-25 DEVICE — SCREW BONE T8 FULL THREAD L20 MM OD2.7 MM STARDRIVE NONSTERILE VARIAX FOOT PLATE SYSTEM: Type: IMPLANTABLE DEVICE | Site: ARM | Status: FUNCTIONAL

## 2023-04-25 DEVICE — IMPLANTABLE DEVICE: Type: IMPLANTABLE DEVICE | Site: ARM | Status: FUNCTIONAL

## 2023-04-25 RX ORDER — OXYCODONE HCL 5 MG/5 ML
10 SOLUTION, ORAL ORAL
Status: DISCONTINUED | OUTPATIENT
Start: 2023-04-25 | End: 2023-04-25 | Stop reason: HOSPADM

## 2023-04-25 RX ORDER — DIPHENHYDRAMINE HYDROCHLORIDE 50 MG/ML
12.5 INJECTION INTRAMUSCULAR; INTRAVENOUS
Status: DISCONTINUED | OUTPATIENT
Start: 2023-04-25 | End: 2023-04-25 | Stop reason: HOSPADM

## 2023-04-25 RX ORDER — DEXAMETHASONE SODIUM PHOSPHATE 4 MG/ML
INJECTION, SOLUTION INTRA-ARTICULAR; INTRALESIONAL; INTRAMUSCULAR; INTRAVENOUS; SOFT TISSUE PRN
Status: DISCONTINUED | OUTPATIENT
Start: 2023-04-25 | End: 2023-04-25 | Stop reason: SURG

## 2023-04-25 RX ORDER — HYDROMORPHONE HYDROCHLORIDE 1 MG/ML
0.1 INJECTION, SOLUTION INTRAMUSCULAR; INTRAVENOUS; SUBCUTANEOUS
Status: DISCONTINUED | OUTPATIENT
Start: 2023-04-25 | End: 2023-04-25 | Stop reason: HOSPADM

## 2023-04-25 RX ORDER — OXYCODONE HCL 5 MG/5 ML
5 SOLUTION, ORAL ORAL
Status: DISCONTINUED | OUTPATIENT
Start: 2023-04-25 | End: 2023-04-25 | Stop reason: HOSPADM

## 2023-04-25 RX ORDER — ROPIVACAINE HYDROCHLORIDE 5 MG/ML
INJECTION, SOLUTION EPIDURAL; INFILTRATION; PERINEURAL PRN
Status: DISCONTINUED | OUTPATIENT
Start: 2023-04-25 | End: 2023-04-25 | Stop reason: SURG

## 2023-04-25 RX ORDER — CEFAZOLIN SODIUM 1 G/3ML
INJECTION, POWDER, FOR SOLUTION INTRAMUSCULAR; INTRAVENOUS PRN
Status: DISCONTINUED | OUTPATIENT
Start: 2023-04-25 | End: 2023-04-25 | Stop reason: SURG

## 2023-04-25 RX ORDER — HYDROMORPHONE HYDROCHLORIDE 1 MG/ML
0.4 INJECTION, SOLUTION INTRAMUSCULAR; INTRAVENOUS; SUBCUTANEOUS
Status: DISCONTINUED | OUTPATIENT
Start: 2023-04-25 | End: 2023-04-25 | Stop reason: HOSPADM

## 2023-04-25 RX ORDER — SODIUM CHLORIDE, SODIUM LACTATE, POTASSIUM CHLORIDE, CALCIUM CHLORIDE 600; 310; 30; 20 MG/100ML; MG/100ML; MG/100ML; MG/100ML
INJECTION, SOLUTION INTRAVENOUS CONTINUOUS
Status: DISCONTINUED | OUTPATIENT
Start: 2023-04-25 | End: 2023-04-25 | Stop reason: HOSPADM

## 2023-04-25 RX ORDER — ONDANSETRON 2 MG/ML
INJECTION INTRAMUSCULAR; INTRAVENOUS PRN
Status: DISCONTINUED | OUTPATIENT
Start: 2023-04-25 | End: 2023-04-25 | Stop reason: SURG

## 2023-04-25 RX ORDER — ONDANSETRON 2 MG/ML
4 INJECTION INTRAMUSCULAR; INTRAVENOUS
Status: DISCONTINUED | OUTPATIENT
Start: 2023-04-25 | End: 2023-04-25 | Stop reason: HOSPADM

## 2023-04-25 RX ORDER — HYDROMORPHONE HYDROCHLORIDE 1 MG/ML
0.2 INJECTION, SOLUTION INTRAMUSCULAR; INTRAVENOUS; SUBCUTANEOUS
Status: DISCONTINUED | OUTPATIENT
Start: 2023-04-25 | End: 2023-04-25 | Stop reason: HOSPADM

## 2023-04-25 RX ORDER — MEPERIDINE HYDROCHLORIDE 25 MG/ML
6.25 INJECTION INTRAMUSCULAR; INTRAVENOUS; SUBCUTANEOUS
Status: DISCONTINUED | OUTPATIENT
Start: 2023-04-25 | End: 2023-04-25 | Stop reason: HOSPADM

## 2023-04-25 RX ORDER — HALOPERIDOL 5 MG/ML
1 INJECTION INTRAMUSCULAR
Status: DISCONTINUED | OUTPATIENT
Start: 2023-04-25 | End: 2023-04-25 | Stop reason: HOSPADM

## 2023-04-25 RX ORDER — MIDAZOLAM HYDROCHLORIDE 1 MG/ML
INJECTION INTRAMUSCULAR; INTRAVENOUS PRN
Status: DISCONTINUED | OUTPATIENT
Start: 2023-04-25 | End: 2023-04-25 | Stop reason: SURG

## 2023-04-25 RX ADMIN — FENTANYL CITRATE 100 MCG: 50 INJECTION, SOLUTION INTRAMUSCULAR; INTRAVENOUS at 14:06

## 2023-04-25 RX ADMIN — OXYCODONE HYDROCHLORIDE 10 MG: 10 TABLET ORAL at 10:36

## 2023-04-25 RX ADMIN — OXYCODONE HYDROCHLORIDE 10 MG: 10 TABLET ORAL at 07:35

## 2023-04-25 RX ADMIN — DEXAMETHASONE SODIUM PHOSPHATE 8 MG: 4 INJECTION, SOLUTION INTRA-ARTICULAR; INTRALESIONAL; INTRAMUSCULAR; INTRAVENOUS; SOFT TISSUE at 14:12

## 2023-04-25 RX ADMIN — OXYCODONE HYDROCHLORIDE 10 MG: 10 TABLET ORAL at 03:28

## 2023-04-25 RX ADMIN — SODIUM CHLORIDE, POTASSIUM CHLORIDE, SODIUM LACTATE AND CALCIUM CHLORIDE: 600; 310; 30; 20 INJECTION, SOLUTION INTRAVENOUS at 18:06

## 2023-04-25 RX ADMIN — ACETAMINOPHEN 1000 MG: 500 TABLET, FILM COATED ORAL at 18:50

## 2023-04-25 RX ADMIN — ROPIVACAINE HYDROCHLORIDE 20 ML: 5 INJECTION, SOLUTION EPIDURAL; INFILTRATION; PERINEURAL at 14:07

## 2023-04-25 RX ADMIN — ACETAMINOPHEN 1000 MG: 500 TABLET, FILM COATED ORAL at 04:57

## 2023-04-25 RX ADMIN — MIDAZOLAM HYDROCHLORIDE 2 MG: 1 INJECTION, SOLUTION INTRAMUSCULAR; INTRAVENOUS at 14:06

## 2023-04-25 RX ADMIN — HYDROMORPHONE HYDROCHLORIDE 0.5 MG: 1 INJECTION, SOLUTION INTRAMUSCULAR; INTRAVENOUS; SUBCUTANEOUS at 00:01

## 2023-04-25 RX ADMIN — SODIUM CHLORIDE, POTASSIUM CHLORIDE, SODIUM LACTATE AND CALCIUM CHLORIDE: 600; 310; 30; 20 INJECTION, SOLUTION INTRAVENOUS at 07:36

## 2023-04-25 RX ADMIN — PROPOFOL 200 MG: 10 INJECTION, EMULSION INTRAVENOUS at 14:12

## 2023-04-25 RX ADMIN — ONDANSETRON 4 MG: 2 INJECTION INTRAMUSCULAR; INTRAVENOUS at 14:12

## 2023-04-25 RX ADMIN — CEFAZOLIN 2 G: 330 INJECTION, POWDER, FOR SOLUTION INTRAMUSCULAR; INTRAVENOUS at 14:15

## 2023-04-25 ASSESSMENT — PATIENT HEALTH QUESTIONNAIRE - PHQ9
SUM OF ALL RESPONSES TO PHQ9 QUESTIONS 1 AND 2: 0
2. FEELING DOWN, DEPRESSED, IRRITABLE, OR HOPELESS: NOT AT ALL
1. LITTLE INTEREST OR PLEASURE IN DOING THINGS: NOT AT ALL

## 2023-04-25 ASSESSMENT — PAIN DESCRIPTION - PAIN TYPE
TYPE: ACUTE PAIN

## 2023-04-25 NOTE — ED TRIAGE NOTES
"Gilmar Sanders  28 y.o.    Chief Complaint   Patient presents with    Arm Pain     Patient was on a ladder today and experienced a fall to his right arm. Pt right wrist/hand is notably swollen, possibly dislocated. Patient is Welsh speaking only. Denies hitting his head or any LOC.        BP (!) 136/101   Pulse 98   Temp 36.9 °C (98.5 °F) (Temporal)   Resp 18   Ht 1.626 m (5' 4\")   Wt 65.8 kg (145 lb)   SpO2 99%   BMI 24.89 kg/m²     Protocol ordered. Pt educated to alert staff with any changes or concerns.   "

## 2023-04-25 NOTE — ED NOTES
Pt resting on [place], medicated per MAR for 8/10 right arm pain. No acute signs of distress present.

## 2023-04-25 NOTE — ED PROVIDER NOTES
"ED Provider Note    CHIEF COMPLAINT  Chief Complaint   Patient presents with    Arm Pain     Patient was on a ladder today and experienced a fall to his right arm. Pt right wrist/hand is notably swollen, possibly dislocated. Patient is Uzbek speaking only. Denies hitting his head or any LOC.        EXTERNAL RECORDS REVIEWED  PDMP patient does not have a controlled substance prescription within the last year    HPI/ROS  LIMITATION TO HISTORY   Select: Language Wolof,  Used   OUTSIDE HISTORIAN(S):  None    Gilmar Sanders is a 28 y.o. male who presents with right wrist pain and deformity after falling off a ladder.  He denies any other injury, he has obvious deformity of the right wrist.  He reports he is able to move his fingers.    PAST MEDICAL HISTORY   Patient denies    SURGICAL HISTORY   has a past surgical history that includes open treatment calcaneal fracture (Left, 7/26/2021).    FAMILY HISTORY  History reviewed. No pertinent family history.    SOCIAL HISTORY  Social History     Tobacco Use    Smoking status: Some Days     Packs/day: 0.25     Years: 2.00     Pack years: 0.50     Types: Cigarettes     Start date: 7/1/2019    Smokeless tobacco: Never   Vaping Use    Vaping Use: Never used   Substance and Sexual Activity    Alcohol use: Yes    Drug use: Yes     Types: Inhaled     Comment: COCAINE    Sexual activity: Not on file       CURRENT MEDICATIONS  Home Medications       Reviewed by Maye Chowdhury (Pharmacy Tech) on 04/24/23 at 2004  Med List Status: Complete     Medication Last Dose Status        Patient Timothy Taking any Medications                           ALLERGIES  No Known Allergies    PHYSICAL EXAM  VITAL SIGNS: BP (!) 140/91   Pulse 92   Temp 36.9 °C (98.5 °F) (Temporal)   Resp 17   Ht 1.626 m (5' 4\")   Wt 65.8 kg (145 lb)   SpO2 95%   BMI 24.89 kg/m²    Constitutional: Alert.  HENT: No signs of trauma, Bilateral external ears normal, Nose normal.   Eyes: Pupils are equal and " reactive, Conjunctiva normal, Non-icteric.   Neck: Normal range of motion, No tenderness, Supple, No stridor.   Lymphatic: No lymphadenopathy noted.   Cardiovascular: Regular rate and rhythm, no murmurs.   Thorax & Lungs: Normal breath sounds, No respiratory distress, No wheezing, No chest tenderness.   Abdomen: Bowel sounds normal, Soft, No tenderness, No peritoneal signs, No masses, No pulsatile masses.   Skin: Warm, Dry, No erythema, No rash.   Back: No bony tenderness, No CVA tenderness.   Extremities: Intact distal pulses, No edema, No tenderness, No cyanosis.  Musculoskeletal: Formally of the right wrist.  Motor and sensory intact of the fingers.  Is no open wound.  Neurologic: Alert , Normal motor function, Normal sensory function, No focal deficits noted.   Psychiatric: Affect normal, Judgment normal, Mood normal.       DIAGNOSTIC STUDIES / PROCEDURES    LABS  Labs Reviewed   CBC WITH DIFFERENTIAL - Abnormal; Notable for the following components:       Result Value    WBC 16.4 (*)     Neutrophils-Polys 81.40 (*)     Lymphocytes 9.80 (*)     Neutrophils (Absolute) 13.31 (*)     Monos (Absolute) 1.25 (*)     All other components within normal limits   COMP METABOLIC PANEL - Abnormal; Notable for the following components:    Glucose 100 (*)     All other components within normal limits   CORRECTED CALCIUM   ESTIMATED GFR         RADIOLOGY  I have independently interpreted the diagnostic imaging associated with this visit and am waiting the final reading from the radiologist.   My preliminary interpretation is as follows: Comminuted impacted distal radial fracture into the joint  Radiologist interpretation:     CT-WRIST W/O RIGHT   Final Result         1.  Comminuted distal radial fracture extending into the radiocarpal joint space.   2.  Ulnar styloid fracture   3.  Small ligamentous avulsion fracture fragment along the dorsal radial aspect of the scaphoid.      DX-WRIST-COMPLETE 3+ RIGHT   Final Result       Distal radius and ulnar fractures.      DX-FOREARM RIGHT   Final Result      1.  Acute, comminuted, impacted distal radial fracture, with fracture lines extending into the radiocarpal joint and distal radioulnar joint.   2.  No ulnar styloid fracture definitively identified.              COURSE & MEDICAL DECISION MAKING    ED Observation Status? Yes; I am placing the patient in to an observation status due to a diagnostic uncertainty as well as therapeutic intensity. Patient placed in observation status at 6:11 PM, 4/24/2023.     Observation plan is as follows: The patient presents with wrist deformity.  X-ray was ordered, labs ordered.  IV was established, the patient was given 4 mg IV morphine and 4 mg IV Zofran.    Upon Reevaluation, the patient's condition has: not improved; and will be escalated to hospitalization.    Patient discharged from ED Observation status at 7:30 PM (Time) April 24, 2023 (Date).     INITIAL ASSESSMENT, COURSE AND PLAN  Care Narrative: The patient's labs are unremarkable except for an elevated white count which is likely an acute phase reactant secondary to his wrist fracture.        ADDITIONAL PROBLEM LIST  None  DISPOSITION AND DISCUSSIONS  I have discussed management of the patient with the following physicians and MICHELLE's: I discussed the case with Dr. Eric Mendoza who will likely do surgery on the patient tomorrow morning.  We agree that reducing the wrist would likely not be helpful, we have placed the patient in a sugar-tong splint.  I discussed the case with the hospitalist to assess the patient for hospitalization.    Discussion of management with other [facility] or appropriate source(s): None     Barriers to care at this time, including but not limited to:  None .     Decision tools and prescription drugs considered including, but not limited to: Pain Medications the patient is receiving IV pain medications and IV nausea medications .    FINAL DIAGNOSIS  Acute right distal radius  fracture, closed, comminuted, intra-articular       Electronically signed by: Reymundo Garcia M.D., 4/24/2023 6:11 PM

## 2023-04-25 NOTE — PROGRESS NOTES
Right comminuted distal radius fracture  [place] intact   Severe intra-articular comminution  CT scan pending  ORIF tomorrow am  NPO at mn      Eric Mendoza MD  Orthopedic Trauma Surgery

## 2023-04-25 NOTE — ANESTHESIA TIME REPORT
Anesthesia Start and Stop Event Times     Date Time Event    4/25/2023 1334 Ready for Procedure     1406 Anesthesia Start     1623 Anesthesia Stop        Responsible Staff  04/25/23    Name Role Begin End    Lul Munoz M.D. Anesth 1400 1623        Overtime Reason:  no overtime (within assigned shift)    Comments:

## 2023-04-25 NOTE — ED NOTES
Report to T332/2, RN. All questions and concerns addressed at this time. Pt updated on poc and admit plans. PIV remains and patent.   Room checked and all pt belongings transported with pt. Pt to T332 now with transport via rney.

## 2023-04-25 NOTE — ASSESSMENT & PLAN NOTE
- CT wrist w/ omminuted distal radial fracture with intra-articular extension and ulnar styloid fracture. Orthopedic surgery consulted, plan for OR in the AM  - admit to inpatient for pain control- multimodal analgesics ordered, monitor and escalate as needed  - NPO at midnight  - SCD for dvt prophylaxis

## 2023-04-25 NOTE — ANESTHESIA POSTPROCEDURE EVALUATION
Patient: Gilmar Sanders    Procedure Summary     Date: 04/25/23 Room / Location: [place] [address] / SURGERY [facility]    Anesthesia Start: 1406 Anesthesia Stop: 1623    Procedure: ORIF, FRACTURE, RADIUS AND ULNA (Right: Arm Lower) Diagnosis: (comminuted intra-articular distal radius fracture)    Surgeons: Eric Mendoza M.D. Responsible Provider: Lul Munoz M.D.    Anesthesia Type: general, peripheral nerve block ASA Status: 1          Final Anesthesia Type: general, peripheral nerve block  Last vitals  BP   Blood Pressure: (!) 132/90    Temp   36.1 °C (97 °F)    Pulse   84   Resp   18    SpO2   95 %      Anesthesia Post Evaluation    Patient location during evaluation: PACU  Patient participation: complete - patient participated  Level of consciousness: awake and alert    Airway patency: patent  Anesthetic complications: no  Cardiovascular status: hemodynamically stable  Respiratory status: face mask    PONV: none          No notable events documented.     Nurse Pain Score: 8 (NPRS)

## 2023-04-25 NOTE — CONSULTS
"4/25/2023    Time Called: 1900  Time Arrived: 0900      HPI: Gilmar Sanders is a 28 y.o. male who presents with right wrist and hand pain after falling from a ladder.  He was on a ladder at work carrying long pieces of wood when he fell from the ladder reportedly dropping the wood onto his wrist.  He denies any other injuries sustained during the fall.  He was seen in the ED where initial x-rays revealed a comminuted intra-articular distal radius fracture.  He was admitted overnight for pain control with plans for surgical fixation.    History reviewed. No pertinent past medical history.    Past Surgical History:   Procedure Laterality Date    PB OPEN TREATMENT CALCANEAL FRACTURE Left 7/26/2021    Procedure: ORIF, FRACTURE, CALCANEUS - AND OTHER INDICATED PROCEDURES;  Surgeon: Arun Cantu M.D.;  Location: SURGERY [facility];  Service: Orthopedics       Medications  No current facility-administered medications on file prior to encounter.     No current outpatient medications on file prior to encounter.       Allergies  Patient has no known allergies.    ROS  . All other systems were reviewed and found to be negative    History reviewed. No pertinent family history.    Social History     Socioeconomic History    Marital status:    Tobacco Use    Smoking status: Some Days     Packs/day: 0.25     Years: 2.00     Pack years: 0.50     Types: Cigarettes     Start date: 7/1/2019    Smokeless tobacco: Never   Vaping Use    Vaping Use: Never used   Substance and Sexual Activity    Alcohol use: Yes    Drug use: Yes     Types: Inhaled     Comment: COCAINE       Physical Exam  Vitals  /89   Pulse 71   Temp 36.8 °C (98.2 °F) (Temporal)   Resp 15   Ht 1.68 m (5' 6.14\")   Wt 60 kg (132 lb 4.4 oz)   SpO2 95%   General: Well Developed, Well Nourished, Age appropriate appearance  HEENT: Normocephalic, atraumatic  Psych: Normal mood and affect  Neck: Supple, nontender, no masses  Lungs: Breathing unlabored, No " audible wheezing  Heart: Regular heart rate and rhythm  Abdomen: Soft, NT, ND  Neuro: Sensation grossly intact to BUE and BLE, moving all four extremities  Skin: Intact, no open wounds  Vascular: Hand is warm and well-perfused, Capillary refill <2 seconds  MSK: Right wrist: EPL intact.  Wiggles all his fingers.  Sensation intact to radial ulnar median nerves.  AIN/PIN intact.  Elbow motion intact.      Radiographs:  CT-WRIST W/O RIGHT   Final Result         1.  Comminuted distal radial fracture extending into the radiocarpal joint space.   2.  Ulnar styloid fracture   3.  Small ligamentous avulsion fracture fragment along the dorsal radial aspect of the scaphoid.      DX-WRIST-COMPLETE 3+ RIGHT   Final Result      Distal radius and ulnar fractures.      DX-FOREARM RIGHT   Final Result      1.  Acute, comminuted, impacted distal radial fracture, with fracture lines extending into the radiocarpal joint and distal radioulnar joint.   2.  No ulnar styloid fracture definitively identified.      DX-PORTABLE FLUORO > 1 HOUR    (Results Pending)   DX-WRIST-COMPLETE 3+ RIGHT    (Results Pending)       Laboratory Values  Recent Labs     04/24/23  1852   WBC 16.4*   RBC 5.28   HEMOGLOBIN 15.8   HEMATOCRIT 46.7   MCV 88.4   MCH 29.9   MCHC 33.8   RDW 43.6   PLATELETCT 263   MPV 9.8     Recent Labs     04/24/23  1852   SODIUM 139   POTASSIUM 4.1   CHLORIDE 106   CO2 20   GLUCOSE 100*   BUN 16             Impression: 28-year-old male fall from a ladder with comminuted intra-articular distal radius fracture on the right.  Plan for open reduction internal fixation versus dorsal spanning plate today.  Risk and benefits of surgery discussed at length with the patient including bleeding infection damage to surrounding structures such as nerves vessels and tendons.  Patient understands this and wishes to move forward with surgery.  We also discussed increased risk of posttraumatic arthritis due to the intra-articular comminuted nature  of the fracture.  We also discussed he will likely be stiff after surgery which will require therapy to work on his motion.    Plan:We discussed the diagnosis and findings with the patient at length.  We reviewed possible non operative and operative interventions and the risks and benefits of each of these.  he had a chance to ask questions and all of these were answered to his satisfaction. The patient chose to proceed with surgical intervention. Risks and benefits of surgery were discussed which include but are not limited to bleeding, infection, neurovascular damage, malunion, nonunion, instability, limb length discrepancy, DVT, PE, MI, Stroke and death. They understand these risks and wish to proceed.      Eric Mendoza MD  Orthopedic Trauma Surgery

## 2023-04-25 NOTE — H&P
[facility] Internal Medicine History & Physical Note    Date of Service  4/24/2023    [facility] Team: JAYSHREE   Attending: Michael Gupta M.d.  Senior Resident: Dr. Harry  Contact Number: [phone]    Primary Care Physician  Pcp Pt States None    Consultants  orthopedics    Specialist Names: Dr. Eric Mendoza    Code Status  Full Code    Chief Complaint  Chief Complaint   Patient presents with    Arm Pain     Patient was on a ladder today and experienced a fall to his right arm. Pt right wrist/hand is notably swollen, possibly dislocated. Patient is Filipino speaking only. Denies hitting his head or any LOC.        History of Presenting Illness (HPI):   Patient is Filipino speaking- History is obtained via phone - Johnnie ([id number])    Gilmar Sanders is a 28 y.o. male who presented 4/24/2023 with with right wrist pain and deformity. He was on a ladder at work, carrying long pieces of wood when he felt off the ladder. He reports hitting his chin on the ladder and the pieces of wood felt onto his wrist. He denies any loss of consciousness, dizziness prior to the fall  In ED, Xray of right wrist w/comminuted, impacted distal radial fracture. Orthopedic surgeon, Dr. Eric Mendoza consulted by EDP, reviewed imaging and plan to take patient in OR in the AM. Patient received morphine for pain and wrist placed in sugar-tong splint by Dr. Garcia.  CBC remarkable for leukocytosis of 16.4. Patient denies any recent illnesses, fever or chills, denies urinary symptoms. He reports 8/10 wrist pain.   Patient will be admitted for pain control and pending surgery tomorrow    I discussed the plan of care with patient, family, bedside RN, and attending,  .    Review of Systems  Review of Systems   Constitutional:  Negative for chills, fever and malaise/fatigue.   HENT:  Negative for congestion, ear pain, sinus pain and sore throat.    Eyes:  Negative for blurred vision, double vision and photophobia.   Respiratory:  Negative  for cough, shortness of breath and wheezing.    Cardiovascular:  Negative for chest pain, palpitations and leg swelling.   Gastrointestinal:  Negative for abdominal pain, nausea and vomiting.   Genitourinary:  Negative for dysuria, frequency and urgency.   Musculoskeletal:  Positive for joint pain.   Skin:  Negative for rash.   Neurological:  Negative for dizziness, sensory change and headaches.   Psychiatric/Behavioral:  Negative for depression and substance abuse.      Past Medical History   has no past medical history on file.    Surgical History   has a past surgical history that includes pr open treatment calcaneal fracture (Left, 7/26/2021).     Family History  family history is not on file.   Family history reviewed with patient.     Social History  Tobacco: never  Alcohol: ocassional, 1-3 drinks/week   Recreational drugs (illegal or prescription): never  Employment: construction/framing  Recent Travel: denies  Primary Care Provider: Reviewed None  Other (stressors, spirituality, exposures): None    Allergies  No Known Allergies    Medications  None       Physical Exam  Temp:  [36.9 °C (98.5 °F)-37.5 °C (99.5 °F)] 37.5 °C (99.5 °F)  Pulse:  [92-98] 96  Resp:  [17-18] 17  BP: (136-152)/() 145/104  SpO2:  [95 %-99 %] 98 %    Physical Exam  Constitutional:       General: He is not in acute distress.     Appearance: Normal appearance. He is not ill-appearing.   HENT:      Head: Normocephalic and atraumatic.      Comments: Laceration on right chin     Nose: Nose normal. No congestion or rhinorrhea.      Mouth/Throat:      Mouth: Mucous membranes are moist.      Pharynx: Oropharynx is clear.   Eyes:      Extraocular Movements: Extraocular movements intact.      Conjunctiva/sclera: Conjunctivae normal.      Pupils: Pupils are equal, round, and reactive to light.   Cardiovascular:      Rate and Rhythm: Normal rate and regular rhythm.      Pulses: Normal pulses.      Heart sounds: Normal heart sounds. No murmur  heard.  Pulmonary:      Effort: Pulmonary effort is normal. No respiratory distress.      Breath sounds: Normal breath sounds. No wheezing.   Abdominal:      General: There is no distension.      Palpations: Abdomen is soft.      Tenderness: There is no abdominal tenderness.   Musculoskeletal:      Cervical back: Normal range of motion and neck supple.      Right lower leg: No edema.      Left lower leg: No edema.      Comments: Right arm in cast, able to move fingers, decreased sensation at fingertips    Neurological:      Mental Status: He is alert.       Laboratory:  Recent Labs     04/24/23 1852   WBC 16.4*   RBC 5.28   HEMOGLOBIN 15.8   HEMATOCRIT 46.7   MCV 88.4   MCH 29.9   MCHC 33.8   RDW 43.6   PLATELETCT 263   MPV 9.8     Recent Labs     04/24/23 1852   SODIUM 139   POTASSIUM 4.1   CHLORIDE 106   CO2 20   GLUCOSE 100*   BUN 16   CREATININE 0.91   CALCIUM 9.0     Recent Labs     04/24/23 1852   ALTSGPT 31   ASTSGOT 27   ALKPHOSPHAT 70   TBILIRUBIN 0.2   GLUCOSE 100*       Imaging:  CT-WRIST W/O RIGHT   Final Result         1.  Comminuted distal radial fracture extending into the radiocarpal joint space.   2.  Ulnar styloid fracture   3.  Small ligamentous avulsion fracture fragment along the dorsal radial aspect of the scaphoid.      DX-WRIST-COMPLETE 3+ RIGHT   Final Result      Distal radius and ulnar fractures.      DX-FOREARM RIGHT   Final Result      1.  Acute, comminuted, impacted distal radial fracture, with fracture lines extending into the radiocarpal joint and distal radioulnar joint.   2.  No ulnar styloid fracture definitively identified.          X-Ray:  I have personally reviewed the images and compared with prior images.    Assessment/Plan:  Problem Representation:     28 y.o M w/o significant medical hx admitted for pain control, anticipating surgical treatment for right comminuted distal radius fracture    I anticipate this patient will require at least two midnights for appropriate  medical management, necessitating inpatient admission because anticipate surgery and will need continued pain control    Patient will need a Med/Surg bed on ORTHOPEDICS service .  The need is secondary anticipate surgical treatment for radial fracture.     * Closed fracture of right wrist- (present on admission)  Assessment & Plan  - CT wrist w/ omminuted distal radial fracture with intra-articular extension and ulnar styloid fracture. Orthopedic surgery consulted, plan for OR in the AM  - admit to inpatient for pain control- multimodal analgesics ordered, monitor and escalate as needed  - NPO at midnight  - SCD for dvt prophylaxis     Leukocytosis  Assessment & Plan  -likely reactive secondary to acute fracture  -monitor for signs/symptoms of infection   -repeat CBC in AM        VTE prophylaxis: SCDs/TEDs

## 2023-04-25 NOTE — ASSESSMENT & PLAN NOTE
-likely reactive secondary to acute fracture  -monitor for signs/symptoms of infection   -repeat CBC in AM

## 2023-04-25 NOTE — OR NURSING
1619: Patient arrived from OR, handoff  completed. Patient placed on monitors with audible alarms. SCD's applied.     1635: Oral airway removed, patient tolerating well.    1710: Report to Layton BURRELL, opportunity for questions provided. Tolerating oral intake well.

## 2023-04-25 NOTE — CARE PLAN
Problem: Knowledge Deficit - Standard  Goal: Patient and family/care givers will demonstrate understanding of plan of care, disease process/condition, diagnostic tests and medications  Outcome: Progressing     Problem: Pain - Standard  Goal: Alleviation of pain or a reduction in pain to the patient’s comfort goal  Outcome: Progressing   The patient is Stable - Low risk of patient condition declining or worsening    Shift Goals  Clinical Goals: surgery  Patient Goals: pain control  Family Goals: CARISA    Progress made toward(s) clinical / shift goals:  Patient updated on POC    Patient is not progressing towards the following goals:

## 2023-04-25 NOTE — PROGRESS NOTES
4 Eyes Skin Assessment Completed by INDRA St and INDRA Alcaraz.    Head Scratch on the lower chin  Ears WDL  Nose WDL  Mouth WDL  Neck WDL  Breast/Chest WDL  Shoulder Blades WDL  Spine WDL  (R) Arm/Elbow/Hand Splint on right arm  (L) Arm/Elbow/Hand WDL  Abdomen WDL  Groin WDL  Scrotum/Coccyx/Buttocks WDL  (R) Leg Bruising on the right upper thigh  (L) Leg WDL  (R) Heel/Foot/Toe WDL  (L) Heel/Foot/Toe WDL          Devices In Places Splint on right arm      Interventions In Place N/A    Possible Skin Injury No    Pictures Uploaded Into Epic Yes  Wound Consult Placed N/A  RN Wound Prevention Protocol Ordered No

## 2023-04-25 NOTE — CARE PLAN
The patient is Stable - Low risk of patient condition declining or worsening    Shift Goals  Clinical Goals: pain mgmt, surgery  Patient Goals: pain control, NPO  Family Goals: translate    Progress made toward(s) clinical / shift goals:        Problem: Knowledge Deficit - Standard  Goal: Patient and family/care givers will demonstrate understanding of plan of care, disease process/condition, diagnostic tests and medications  Outcome: Progressing     Problem: Pain - Standard  Goal: Alleviation of pain or a reduction in pain to the patient’s comfort goal  Outcome: Progressing       Patient is not progressing towards the following goals:

## 2023-04-25 NOTE — ED NOTES
Med rec completed per patient at bedside with  Rosaline ([id number]).  Allergies reviewed with patient. NKDA.  [facility]'s preferred [facility]     Patient denies using any prescription medications at home.  No vitamins or supplements.  No recent over-the-counter medications.  No outpatient antibiotics within the last 30 days.

## 2023-04-25 NOTE — OP REPORT
DATE OF OPERATION: 4/25/2023     PREOPERATIVE DIAGNOSIS: Right distal radius intra-articular fracture of more than 3 intra-articular fragments    POSTOPERATIVE DIAGNOSIS: Same    PROCEDURE PERFORMED: Right distal radius open reduction internal fixation    SURGEON: Eric Mendoza M.D.     ASSISTANT: Pascual Hayes MD, fellow    ANESTHESIA: General    SPECIMEN: None    ESTIMATED BLOOD LOSS: 5 mL    IMPLANTS: Amanda distal radius volar locking plate, [place] 2.7 millimeter screws      INDICATIONS: The patient is a 28 y.o. male who presented with comminuted intra-articular distal radius fracture after a fall from a ladder.  I discussed the risks and benefits of the procedure which include but are not limited to risks of infection, wound healing complication, neurovascular injury, pain, malunion, non-union, malrotation, and the medical risks of anesthesia including MI, stroke, and death.  Alternatives to surgery were also discussed, including non-operative management, which I did not recommend.  The patient was in agreement with the plan to proceed, and the informed consent was signed and documented.  I met with the patient pre-operatively and marked the operative extremity with their agreement.  We proceeded to the operating room.     DESCRIPTION OF PROCEDURE:  Patient was seen in the preoperative holding area on the day of surgery. The operative site was marked with my initials.  he was taken to the operating room and placed supine on the operative table.  Anesthesia was induced.  The operative extremity was prepped and draped in the normal sterile fashion.  Operative pause was conducted and the correct patient, site, side, procedure, and surgeon's initials on extremity were identified.  Standard volar approach to the distal radius was performed.  Skin knife was used initially.  The FCR tendon was identified and released retracted ulnarly.  FPL tendon was then retracted ulnarly as well.  Pronator quadratus  was then released off the distal radius revealing comminuted intra-articular distal radius with metaphyseal comminuted fracture as well.  We began by identifying the joint surface using combination of rotation traction and pointed reduction clamp we compressed the intra-articular sagittal fracture lines.  Once the joint surface was reduced in adequate position we placed 2 K wires holding it provisionally.  We then reduced the articular block to the diaphysis.  This was also held with multiple K wires.  Next in order to maintain our reduction and fixation of the articular surface we placed a single 2.7 mm positional screw in the subchondral region.  We then reduced the metaphyseal fragments back to a better position.  Once we had adequate reduction of the metaphyseal comminuted fragments we placed our volar locking plate in position.  We checked this on AP and lateral views.  We noticed there was still slight dorsal angulation of the joint surface in relationship to the shaft.  We drilled and placed a single nonlocking screw in the distal fragment.  We then drilled and placed a locking screw to assist with fixation of the distal articular block.  Next we drilled and placed a nonlocking shaft screw which helped restore the volar tilt.  After this we placed the remaining distal locking screws taking care to avoid dorsal cortex penetration of the screws.  We then placed several additional shaft screws providing additional fixation.  Next we placed a point reduction clamp the dorsal aspect of the metaphyseal region to assist in reduction of a displaced fragment.  We then drilled and placed a nonlocking screw in the metaphyseal region to assist with capture this fragment.  Finally an additional 2.7 mm nonlocking screw was drilled and placed under fluoroscopic guidance shows underneath the joint and subchondral region to assist with fixation of the volar ulnar fragment.  At this point final images were obtained indicating  a appropriate reduction and fixation and restoration of the joint surface.  The hardware was in good position.  The DRUJ was then tested using a shuck exam both neutral pronation and supination and noted to be stable.  Tourniquet was then deflated and any active bleeding was cauterized.  Wound was thoroughly irrigated with sterile saline.  Wound was closed in layered fashion and sterile dressings were applied.  A well-padded volar slab splint was then placed.  The patient was awoken taken to PACU in stable condition.    POSTOPERATIVE PLAN: Nonweightbearing right upper extremity weight bearing.  Okay for discharge home from orthopedic surgery standpoint when his pain is controlled.  The patient will follow up in clinic in 2 weeks to check wounds and remove sutures/staples.  At which time we will likely get him started with some formal therapy work on his motion.      ____________________________________   Eric Mendoza M.D.   DD: 4/25/2023  4:07 PM

## 2023-04-25 NOTE — ED NOTES
Bedside report received by INDRA Jones.  pt resting on gurney. Appears comfortable, no acute signs of distress present.

## 2023-04-25 NOTE — PROGRESS NOTES
Patient arrived at the unit via gurney assisted by family member and transport services with belongings on his bed. Oriented and Yakut speaking only. Assumed care and assessment done. NAD VSS. Skin check done assisted by Lissa. Answered all questions at this time. Placed call light within reach.

## 2023-04-25 NOTE — PROGRESS NOTES
Assumed patient care and received report from Alma Rosa BURRELL Assessment completed. Pt A&Ox 4 Lebanese speaking. Respirations are even and unlabored on room air. Pt reports pain at this time. Surgical, VS stable, call light and belongings within reach. POC updated (Surgery). Pt educated on room and call light, pt verbalized understanding. Communication board updated. Needs met.

## 2023-04-25 NOTE — ANESTHESIA PROCEDURE NOTES
Airway    Date/Time: 4/25/2023 2:13 PM  Performed by: Lul Munoz M.D.  Authorized by: Lul Munoz M.D.     Location:  OR  Urgency:  Elective  Difficult Airway: No    Indications for Airway Management:  Anesthesia      Spontaneous Ventilation: absent    Sedation Level:  Deep  Preoxygenated: Yes    Mask Difficulty Assessment:  0 - not attempted  Final Airway Type:  Supraglottic airway  Final Supraglottic Airway:  Standard LMA    SGA Size:  4  Number of Attempts at Approach:  1

## 2023-04-25 NOTE — ANESTHESIA PREPROCEDURE EVALUATION
" Case: [id number] Date/Time: 04/25/23 1315    Procedure: ORIF, FRACTURE, RADIUS AND ULNA (Right)    Location: [place] [address] / SURGERY [facility]    Surgeons: Eric Mendoza M.D.          Relevant Problems   No relevant active problems     Healthy.  > 4 mets.  Right wrist fx.     BP (!) 132/90   Pulse 84   Temp 36.1 °C (97 °F) (Temporal)   Resp 18   Ht 1.68 m (5' 6.14\")   Wt 60 kg (132 lb 4.4 oz)   SpO2 95%   BMI 21.26 kg/m²     Physical Exam    Airway   Mallampati: II  TM distance: >3 FB  Neck ROM: full       Cardiovascular - normal exam  Rhythm: regular  Rate: normal  (-) murmur     Dental - normal exam           Pulmonary - normal exam  Breath sounds clear to auscultation     Abdominal    Neurological - normal exam                 Anesthesia Plan    ASA 1       Plan - general and peripheral nerve block     Peripheral nerve block will be post-op pain control  Airway plan will be LMA          Induction: intravenous    Postoperative Plan: Postoperative administration of opioids is intended.    Pertinent diagnostic labs and testing reviewed    Informed Consent:    Anesthetic plan and risks discussed with patient.    Use of blood products discussed with: patient whom consented to blood products.         "

## 2023-04-25 NOTE — ANESTHESIA PROCEDURE NOTES
Peripheral Block    Date/Time: 4/25/2023 2:07 PM  Performed by: Lul Munoz M.D.  Authorized by: Lul Munoz M.D.     Patient Location:  OR  Start Time:  4/25/2023 2:07 PM  End Time:  4/25/2023 2:09 PM  Reason for Block: at surgeon's request and post-op pain management ONLY    patient identified, IV checked, site marked, risks and benefits discussed, surgical consent, monitors and equipment checked, pre-op evaluation and timeout performed    Patient Position:  Supine  Prep: ChloraPrep    Monitoring:  Heart rate, continuous pulse ox and cardiac monitor  Block Region:  Upper Extremity  Upper Extremity - Block Type:  BRACHIAL PLEXUS block, Supraclavicular approach    Laterality:  Right  Procedures: ultrasound guided  Image captured, interpreted and electronically stored.  Strength:  1 %  Dose:  3 ml  Block Type:  Single-shot  Needle Length:  50mm  Needle Gauge:  22 G  Needle Localization:  Ultrasound guidance  Injection Assessment:  Negative aspiration for heme, no paresthesia on injection, incremental injection and local visualized surrounding nerve on ultrasound

## 2023-04-25 NOTE — ED NOTES
Pt to rm 81 from triage.  Agree with triage note.  Cms intact distal.  Small lac to r side of chin

## 2023-04-26 VITALS
TEMPERATURE: 98.1 F | HEIGHT: 66 IN | OXYGEN SATURATION: 94 % | HEART RATE: 85 BPM | WEIGHT: 132.28 LBS | DIASTOLIC BLOOD PRESSURE: 80 MMHG | SYSTOLIC BLOOD PRESSURE: 122 MMHG | BODY MASS INDEX: 21.26 KG/M2 | RESPIRATION RATE: 16 BRPM

## 2023-04-26 LAB
BASOPHILS # BLD AUTO: 0.2 % (ref 0–1.8)
BASOPHILS # BLD: 0.02 K/UL (ref 0–0.12)
EOSINOPHIL # BLD AUTO: 0.05 K/UL (ref 0–0.51)
EOSINOPHIL NFR BLD: 0.4 % (ref 0–6.9)
ERYTHROCYTE [DISTWIDTH] IN BLOOD BY AUTOMATED COUNT: 43.3 FL (ref 35.9–50)
HCT VFR BLD AUTO: 41.6 % (ref 42–52)
HGB BLD-MCNC: 14.1 G/DL (ref 14–18)
IMM GRANULOCYTES # BLD AUTO: 0.04 K/UL (ref 0–0.11)
IMM GRANULOCYTES NFR BLD AUTO: 0.3 % (ref 0–0.9)
LYMPHOCYTES # BLD AUTO: 2.92 K/UL (ref 1–4.8)
LYMPHOCYTES NFR BLD: 23.4 % (ref 22–41)
MCH RBC QN AUTO: 29.8 PG (ref 27–33)
MCHC RBC AUTO-ENTMCNC: 33.9 G/DL (ref 33.7–35.3)
MCV RBC AUTO: 87.9 FL (ref 81.4–97.8)
MONOCYTES # BLD AUTO: 1.43 K/UL (ref 0–0.85)
MONOCYTES NFR BLD AUTO: 11.5 % (ref 0–13.4)
NEUTROPHILS # BLD AUTO: 8.01 K/UL (ref 1.82–7.42)
NEUTROPHILS NFR BLD: 64.2 % (ref 44–72)
NRBC # BLD AUTO: 0 K/UL
NRBC BLD-RTO: 0 /100 WBC
PLATELET # BLD AUTO: 227 K/UL (ref 164–446)
PMV BLD AUTO: 9.4 FL (ref 9–12.9)
RBC # BLD AUTO: 4.73 M/UL (ref 4.7–6.1)
WBC # BLD AUTO: 12.5 K/UL (ref 4.8–10.8)

## 2023-04-26 PROCEDURE — 97165 OT EVAL LOW COMPLEX 30 MIN: CPT

## 2023-04-26 PROCEDURE — 85025 COMPLETE CBC W/AUTO DIFF WBC: CPT

## 2023-04-26 PROCEDURE — A9270 NON-COVERED ITEM OR SERVICE: HCPCS | Performed by: STUDENT IN AN ORGANIZED HEALTH CARE EDUCATION/TRAINING PROGRAM

## 2023-04-26 PROCEDURE — 700111 HCHG RX REV CODE 636 W/ 250 OVERRIDE (IP): Performed by: STUDENT IN AN ORGANIZED HEALTH CARE EDUCATION/TRAINING PROGRAM

## 2023-04-26 PROCEDURE — 700102 HCHG RX REV CODE 250 W/ 637 OVERRIDE(OP): Performed by: STUDENT IN AN ORGANIZED HEALTH CARE EDUCATION/TRAINING PROGRAM

## 2023-04-26 RX ORDER — OXYCODONE HYDROCHLORIDE 5 MG/1
5 TABLET ORAL EVERY 6 HOURS PRN
Qty: 20 TABLET | Refills: 0 | Status: SHIPPED | OUTPATIENT
Start: 2023-04-26 | End: 2023-05-01

## 2023-04-26 RX ADMIN — OXYCODONE HYDROCHLORIDE 10 MG: 10 TABLET ORAL at 09:56

## 2023-04-26 RX ADMIN — IBUPROFEN 400 MG: 400 TABLET, FILM COATED ORAL at 06:16

## 2023-04-26 RX ADMIN — IBUPROFEN 400 MG: 400 TABLET, FILM COATED ORAL at 15:37

## 2023-04-26 RX ADMIN — OXYCODONE HYDROCHLORIDE 10 MG: 10 TABLET ORAL at 05:29

## 2023-04-26 RX ADMIN — OXYCODONE HYDROCHLORIDE 10 MG: 10 TABLET ORAL at 12:53

## 2023-04-26 RX ADMIN — ACETAMINOPHEN 1000 MG: 500 TABLET, FILM COATED ORAL at 12:53

## 2023-04-26 RX ADMIN — ACETAMINOPHEN 1000 MG: 500 TABLET, FILM COATED ORAL at 01:15

## 2023-04-26 RX ADMIN — HYDROMORPHONE HYDROCHLORIDE 0.5 MG: 1 INJECTION, SOLUTION INTRAMUSCULAR; INTRAVENOUS; SUBCUTANEOUS at 08:05

## 2023-04-26 RX ADMIN — ACETAMINOPHEN 1000 MG: 500 TABLET, FILM COATED ORAL at 05:29

## 2023-04-26 ASSESSMENT — COGNITIVE AND FUNCTIONAL STATUS - GENERAL
DRESSING REGULAR LOWER BODY CLOTHING: A LITTLE
DAILY ACTIVITIY SCORE: 24
DAILY ACTIVITIY SCORE: 19
SUGGESTED CMS G CODE MODIFIER DAILY ACTIVITY: CK
HELP NEEDED FOR BATHING: A LITTLE
PERSONAL GROOMING: A LITTLE
SUGGESTED CMS G CODE MODIFIER DAILY ACTIVITY: CH
SUGGESTED CMS G CODE MODIFIER MOBILITY: CH
DRESSING REGULAR UPPER BODY CLOTHING: A LITTLE
TOILETING: A LITTLE
MOBILITY SCORE: 24

## 2023-04-26 ASSESSMENT — ACTIVITIES OF DAILY LIVING (ADL): TOILETING: INDEPENDENT

## 2023-04-26 ASSESSMENT — PAIN DESCRIPTION - PAIN TYPE: TYPE: ACUTE PAIN;SURGICAL PAIN

## 2023-04-26 NOTE — PROGRESS NOTES
"     Orthopedic PA Progress Note    Interval changes:  Patient doing well postop  RUE dressings and splint are CDI  NWB right wrist/hand, may mobilize and perform ROM to shoulder and elbow  Cleared for DC from orthopedic standpoint pending  medical optimization    ROS - Patient denies any new issues.  Denies any numbness or tingling. Pain well controlled.    /77   Pulse 71   Temp 37.6 °C (99.7 °F) (Temporal)   Resp 16   Ht 1.68 m (5' 6.14\")   Wt 60 kg (132 lb 4.4 oz)   SpO2 93%     Patient seen and examined  No acute distress  Breathing non labored  RRR  RUE: Dressing CDI with splint intact. Moderate edema extending to fingers. Flexes and extends all fingers equally. Cap refill <2s.    Recent Labs     04/24/23  1852   WBC 16.4*   RBC 5.28   HEMOGLOBIN 15.8   HEMATOCRIT 46.7   MCV 88.4   MCH 29.9   MCHC 33.8   RDW 43.6   PLATELETCT 263   MPV 9.8       Active Hospital Problems    Diagnosis     Closed fracture of right wrist [S62.101A]     Leukocytosis [D72.829]        Assessment/Plan:  Patient doing well postop  RUE dressings and splint are CDI  NWB right wrist/hand, may mobilize and perform ROM to shoulder and elbow  Cleared for DC from orthopedic standpoint medical optimization    POD#1 S/p  Right distal radius open reduction internal fixation  Wt bearing status - NWB RUE, may perform ROM to elbow and shoulder as tolerated  Wound care/Drains - Dressings to be changed every other day by nursing. Or PRN for saturation starting POD#2  Future Procedures - None planned   Sutures/Staples out- 14-21 days post operatively. Removal will completed by ortho MICHELLE's unless transferred.  Antibiotics:  Perioperative completed  DVT Prophylaxis- TEDS/SCDs/Foot pumps  Maxwell-not needed per ortho  Case Coordination for Discharge Planning - Disposition per therapy recs.     "

## 2023-04-26 NOTE — CARE PLAN
The patient is Stable - Low risk of patient condition declining or worsening    Shift Goals  Clinical Goals: surgery  Patient Goals: pain control  Family Goals: CARISA    Progress made toward(s) clinical / shift goals:    Problem: Knowledge Deficit - Standard  Goal: Patient and family/care givers will demonstrate understanding of plan of care, disease process/condition, diagnostic tests and medications  Outcome: Progressing     Problem: Pain - Standard  Goal: Alleviation of pain or a reduction in pain to the patient’s comfort goal  Outcome: Progressing  Denies pain.

## 2023-04-26 NOTE — PROGRESS NOTES
Patient back from PACU received report from Yesenia BURRELL. Assessment completed. Pt A&Ox 4. Respirations are even and unlabored on room air. Pt denies pain at this time. VS stable, call light and belongings within reach.  Pt educated on room and call light, pt verbalized understanding. Communication board updated. Needs met.

## 2023-04-26 NOTE — DISCHARGE SUMMARY
Discharge Summary    CHIEF COMPLAINT ON ADMISSION  Chief Complaint   Patient presents with    Arm Pain     Patient was on a ladder today and experienced a fall to his right arm. Pt right wrist/hand is notably swollen, possibly dislocated. Patient is Bahamian speaking only. Denies hitting his head or any LOC.        Reason for Admission  R hand injury      Admission Date  4/24/2023    CODE STATUS  Full Code    HPI & HOSPITAL COURSE  This is a 28 y.o. male here with right wrist pain.  Gilmar Sanders is a 28 y.o. male who presented 4/24/2023 with with right wrist pain and deformity. He was on a ladder at work, carrying long pieces of wood when he felt off the ladder. He reports hitting his chin on the ladder and the pieces of wood felt onto his wrist. He denies any loss of consciousness, dizziness prior to the fall  In ED, Xray of right wrist w/comminuted, impacted distal radial fracture. Orthopedic surgeon, Dr. Eric Mendoza consulted by EDP, reviewed imaging and plan to take patient in OR in the AM of 4/25. Patient received morphine for pain and wrist placed in sugar-tong splint by Dr. Garcia.  CBC remarkable for leukocytosis of 16.4. Patient denies any recent illnesses, fever or chills, denies urinary symptoms. He reports 8/10 wrist pain.   Patient will be admitted for pain control and pending surgery tomorrow 4/25.    On 4/25 orthopedic surgery, Dr. Mendoza performed a right distal radius open reduction internal fixation, which was successful.  On the following day orthopedics cleared patient for discharge.  His white count trended down to 12 and he shows no signs of infection.  He is nonweightbearing on his right wrist and may mobilize and perform range of motion to shoulder and elbow.  Dressings should be changed every other day or as needed starting tomorrow 4/27.  Patient will follow-up with orthopedic surgery in 2 weeks to get staples/sutures removed.    Occupational Therapy saw patient and recommended  outpatient therapy once cleared by surgery.  Patient was provided with outpatient referral for occupational therapy.    Therefore, he is discharged in good and stable condition to home with close outpatient follow-up.    The patient met 2-midnight criteria for an inpatient stay at the time of discharge.    Discharge Date  4/26/2023    FOLLOW UP ITEMS POST DISCHARGE  Orthopedics  PCP    DISCHARGE DIAGNOSES  Principal Problem:    Closed fracture of right wrist POA: Yes  Active Problems:    Leukocytosis POA: Unknown  Resolved Problems:    * No resolved hospital problems. *      FOLLOW UP  No future appointments.  Aurora Walsh P.A.-C.  [address]  [facility] [address]  [phone]    Follow up in 2 week(s)  For suture removal, For wound re-check      MEDICATIONS ON DISCHARGE     Medication List        START taking these medications        Instructions   oxyCODONE immediate-release 5 MG Tabs  Commonly known as: ROXICODONE   Take 1 Tablet by mouth every 6 hours as needed for Severe Pain for up to 5 days.  Dose: 5 mg              Allergies  No Known Allergies    DIET  Orders Placed This Encounter   Procedures    Diet Order Diet: Regular     Standing Status:   Standing     Number of Occurrences:   1     Order Specific Question:   Diet:     Answer:   Regular [1]       ACTIVITY  As tolerated.  Nonweightbearing to right upper extremity    CONSULTATIONS  Orthopedics    PROCEDURES   Right distal radius open reduction internal fixation    LABORATORY  Lab Results   Component Value Date    SODIUM 139 04/24/2023    POTASSIUM 4.1 04/24/2023    CHLORIDE 106 04/24/2023    CO2 20 04/24/2023    GLUCOSE 100 (H) 04/24/2023    BUN 16 04/24/2023    CREATININE 0.91 04/24/2023        Lab Results   Component Value Date    WBC 12.5 (H) 04/26/2023    HEMOGLOBIN 14.1 04/26/2023    HEMATOCRIT 41.6 (L) 04/26/2023    PLATELETCT 227 04/26/2023        Total time of the discharge process 32 minutes.

## 2023-04-26 NOTE — HOSPITAL COURSE
Gilmar Sanders is a 28 y.o. male who presented 4/24/2023 with with right wrist pain and deformity. He was on a ladder at work, carrying long pieces of wood when he felt off the ladder. He reports hitting his chin on the ladder and the pieces of wood felt onto his wrist. He denies any loss of consciousness, dizziness prior to the fall  In ED, Xray of right wrist w/comminuted, impacted distal radial fracture. Orthopedic surgeon, Dr. Eric Mendoza consulted by EDP, reviewed imaging and plan to take patient in OR in the AM of 4/25. Patient received morphine for pain and wrist placed in sugar-tong splint by Dr. Garcia.  CBC remarkable for leukocytosis of 16.4. Patient denies any recent illnesses, fever or chills, denies urinary symptoms. He reports 8/10 wrist pain.   Patient will be admitted for pain control and pending surgery tomorrow 4/25.    On 4/25 orthopedic surgery, Dr. Mendoza performed a right distal radius open reduction internal fixation, which was successful.  On the following day orthopedics cleared patient for discharge.  His white count trended down to 12 and he shows no signs of infection.  He is nonweightbearing on his right wrist and may mobilize and perform range of motion to shoulder and elbow.  Dressings should be changed every other day or as needed starting tomorrow 4/27.  Patient will follow-up with orthopedic surgery in 2 weeks to get staples/sutures removed.    Occupational Therapy saw patient and recommended outpatient therapy once cleared by surgery.  Patient was provided with outpatient referral for occupational therapy.

## 2023-04-27 NOTE — THERAPY
Occupational Therapy   Initial Evaluation     Patient Name: Gilmar Sanders  Age:  28 y.o., Sex:  male  Medical Record #: [id number]  Today's Date: 4/26/2023     Precautions  Precautions: Fall Risk, Non Weight Bearing Right Upper Extremity  Comments: ROM to shoulder and elbow ok    Assessment    Patient is 28 y.o. male admitted after a fall from a ladder causing a R comminuted distal radial fx extending into the radiocarpal joint, ulnar styloid fx, and small ligamentous avulsion fx to the scaphoid. Pt now s/p ORIF to his R radius. Pt seen for OT evaluation and treatment. Pt donned/doffed socks, stood to brush teeth, and performed toilet transfer w/ supv-SBA. Pt able to move all joints outside of cast. Pt noted to be hypersensitive to all digits during sensation testing. Pt educated regarding showering with the cast, importance of gentle ROM to joints outside of cast, importance of elevation and ice, the role of OT, and WB status. No further acute OT needs are anticipated at this time, but pt is recommended to follow up with an outpatient CHT once cleared by surgeon.     Plan     Pt seen for OT evaluation only.     DC Equipment Recommendations: None  Discharge Recommendations: Recommend outpatient occupational therapy services to address higher level deficits (CHT, once cleared by surgeon)      Objective     04/26/23 1513    Services   Is patient using  services for this encounter? Yes   Language Interpreted Sami    Name [id number]    Mode iPad   Refusal signed by patient? No   Content of Interpretation (select all) Patient Education  (OT eval)   Prior Living Situation   Prior Services None   Housing / Facility 1 Story Apartment / Condo  (on second floor)   Steps Into Home   (FOS)   Steps In Home 0   Elevator No   Bathroom Set up Bathtub / Shower Combination   Equipment Owned None   Lives with - Patient's Self Care Capacity Significant Other   Comments Pt's girlfriend present and  reported ability to assist as needed upon d/c.   Prior Level of ADL Function   Self Feeding Independent   Grooming / Hygiene Independent   Bathing Independent   Dressing Independent   Toileting Independent   Prior Level of IADL Function   Medication Management Independent   Laundry Independent   Kitchen Mobility Independent   Finances Independent   Home Management Independent   Shopping Independent   Prior Level Of Mobility Independent Without Device in Community;Independent Without Device in Home   Driving / Transportation Driving Independent   Occupation (Pre-Hospital Vocational)   (lieberman)   History of Falls   History of Falls Yes   Date of Last Fall   (fall from ladder reason for admission)   Pain   Pain Scales 0 to 10 Scale    Pain 0 - 10 Group   Therapist Pain Assessment Post Activity Pain Same as Prior to Activity;Nurse Notified  (not rated, agreeable to eval)   Non Verbal Descriptors   Non Verbal Scale  Calm   Cognition    Cognition / Consciousness WDL   Level of Consciousness Alert   Comments pleasant, cooperative, and receptive to education   Passive ROM Upper Body   Comments L UE WFL, R UE limited by cast but otherwise functional   Active ROM Upper Body   Dominant Hand Right   Comments L UE WFL, R UE limited by cast but otherwise functional   Strength Upper Body   Upper Body Strength    (L UE WDL, R UE limited by cast and not tested due to WB precautions)   Sensation Upper Body   Upper Extremity Sensation  X   Comments R UE hypersensitive to touch, L UE WDL   Upper Body Muscle Tone   Upper Body Muscle Tone  WDL   Coordination Upper Body   Comments WFL, not formally assessed   Balance Assessment   Sitting Balance (Static) Fair +   Sitting Balance (Dynamic) Fair +   Standing Balance (Static) Fair   Standing Balance (Dynamic) Fair   Weight Shift Sitting Good   Weight Shift Standing Fair   Comments w/ no AD   Bed Mobility    Supine to Sit Supervised   Sit to Supine Supervised   Scooting Supervised    Rolling Supervised   Comments HOB flat, no use of rails   ADL Assessment   Grooming Supervision;Standing  (brushed teeth at sink)   Lower Body Dressing Standby Assist  (don/doff socks, extra time required)   Toileting   (toilet transfer only)   Functional Mobility   Sit to Stand Supervised   Bed, Chair, Wheelchair Transfer Supervised   Toilet Transfers Supervised   Transfer Method Stand Step   Mobility EOB>bathroom>bed   Comments w/ no AD   Visual Perception   Visual Perception  Not Tested   Activity Tolerance   Sitting in Chair NT   Sitting Edge of Bed >5 min   Standing >5 min   Comments no overt reports of pain or fatigue   Education Group   Education Provided Role of Occupational Therapist;Activities of Daily Living;Home Safety;Weight Bearing Precautions  (showers)   Role of Occupational Therapist Patient Response Patient;Acceptance;Explanation;Verbal Demonstration   Home Safety Patient Response Patient;Acceptance;Explanation;Verbal Demonstration   ADL Patient Response Patient;Acceptance;Demonstration;Explanation;Verbal Demonstration;Action Demonstration   Weight Bearing Precautions Patient Response Patient;Acceptance;Explanation;Demonstration;Verbal Demonstration;Action Demonstration   Additional Comments Discussed importance of ice and elevation

## 2023-11-01 PROBLEM — T84.84XA PAINFUL ORTHOPAEDIC HARDWARE (HCC): Status: ACTIVE | Noted: 2023-11-01

## 2024-04-06 ENCOUNTER — HOSPITAL ENCOUNTER (EMERGENCY)
Facility: MEDICAL CENTER | Age: 29
End: 2024-04-06
Attending: STUDENT IN AN ORGANIZED HEALTH CARE EDUCATION/TRAINING PROGRAM

## 2024-04-06 VITALS
OXYGEN SATURATION: 97 % | DIASTOLIC BLOOD PRESSURE: 76 MMHG | WEIGHT: 138 LBS | SYSTOLIC BLOOD PRESSURE: 126 MMHG | RESPIRATION RATE: 15 BRPM | HEIGHT: 64 IN | TEMPERATURE: 97.2 F | BODY MASS INDEX: 23.56 KG/M2 | HEART RATE: 105 BPM

## 2024-04-06 DIAGNOSIS — F15.10 METHAMPHETAMINE USE (HCC): ICD-10-CM

## 2024-04-06 LAB
EKG IMPRESSION: NORMAL
POC BREATHALIZER: 0 PERCENT (ref 0–0.01)

## 2024-04-06 PROCEDURE — 93005 ELECTROCARDIOGRAM TRACING: CPT | Performed by: STUDENT IN AN ORGANIZED HEALTH CARE EDUCATION/TRAINING PROGRAM

## 2024-04-06 PROCEDURE — A9270 NON-COVERED ITEM OR SERVICE: HCPCS | Performed by: STUDENT IN AN ORGANIZED HEALTH CARE EDUCATION/TRAINING PROGRAM

## 2024-04-06 PROCEDURE — 99285 EMERGENCY DEPT VISIT HI MDM: CPT

## 2024-04-06 PROCEDURE — 302970 POC BREATHALIZER: Performed by: STUDENT IN AN ORGANIZED HEALTH CARE EDUCATION/TRAINING PROGRAM

## 2024-04-06 PROCEDURE — 700102 HCHG RX REV CODE 250 W/ 637 OVERRIDE(OP): Performed by: STUDENT IN AN ORGANIZED HEALTH CARE EDUCATION/TRAINING PROGRAM

## 2024-04-06 RX ORDER — LORAZEPAM 1 MG/1
1 TABLET ORAL ONCE
Status: COMPLETED | OUTPATIENT
Start: 2024-04-06 | End: 2024-04-06

## 2024-04-06 RX ORDER — LORAZEPAM 2 MG/ML
1 INJECTION INTRAMUSCULAR ONCE
Status: DISCONTINUED | OUTPATIENT
Start: 2024-04-06 | End: 2024-04-06

## 2024-04-06 RX ADMIN — LORAZEPAM 1 MG: 1 TABLET ORAL at 05:02

## 2024-04-06 ASSESSMENT — FIBROSIS 4 INDEX: FIB4 SCORE: 0.62

## 2024-04-06 ASSESSMENT — PAIN DESCRIPTION - PAIN TYPE: TYPE: ACUTE PAIN

## 2024-04-06 NOTE — ED NOTES
Pt verified address to home.     [facility] [address] [address]    Cab voucher provided for safe dc to home.

## 2024-04-06 NOTE — ED TRIAGE NOTES
"Chief Complaint   Patient presents with    Drug Abuse     BIB by EMS from an apartment. Per EMS, brother said pt took multiple type of drugs. Unknown drug. Pt arrives on 4 point restraints.     Agitation     BS 117mg/dl     Medications given en route: 4mg versed IM on gluteal area    Pt uncooperative and combative on arrival.       /81   Pulse (!) 122   Temp 36.8 °C (98.2 °F) (Temporal)   Resp 15   Ht 1.626 m (5' 4\")   Wt 62.6 kg (138 lb)   SpO2 96%   BMI 23.69 kg/m²    "

## 2024-04-06 NOTE — ED NOTES
Pt has discharge orders.  used for discharge instructions.  Pt educated on discharge instructions.  Pt verbalizes understanding.   Pt ambulatory to lobby with steady gait. Pt going home with taxi. Taxi cab called for pt. Pt educated to wait at ER entrance for taxi.

## 2024-04-06 NOTE — ED PROVIDER NOTES
ED Provider Note    CHIEF COMPLAINT  Chief Complaint   Patient presents with    Drug Abuse     BIB by EMS from an apartment. Per EMS, brother said pt took multiple type of drugs. Unknown drug. Pt arrives on 4 point restraints.     Agitation       EXTERNAL RECORDS REVIEWED  Inpatient Notes Admitted for arm pain after he fell and had a comminuted impacted distal radial fracture    HPI/ROS  LIMITATION TO HISTORY   Select: : None  OUTSIDE HISTORIAN(S):  EMS    Gilmar Sanders is a 29 y.o. male who presents for evaluation of drug intoxication.  Patient states that he was with his brother and they were smoking crystal meth and smoking cigars.  At some point, EMS was called.  It is unclear who called EMS and the patient states that he does not know who called EMS either.  He was aggressive with EMS and was placed in 4-point restraints.  On arrival here, the patient is calm, cooperative, states that he was just using crystal with his brother and denies any SI, HI, hallucinations.  He states that he uses crystal meth about every 4 days.  He denies any chest pain, fever, difficulty breathing, abdominal pain, nausea, vomiting, diarrhea.  He has no chronic medical problems.    PAST MEDICAL HISTORY   He has no chronic medical problems    SURGICAL HISTORY   has a past surgical history that includes open treatment calcaneal fracture (Left, 7/26/2021); open tx radial & ulnar shaft fx fix radius a* (Right, 4/25/2023); and removal deep implant (Right, 11/13/2023).    FAMILY HISTORY  No family history on file.    SOCIAL HISTORY  Social History     Tobacco Use    Smoking status: Some Days     Current packs/day: 0.25     Average packs/day: 0.3 packs/day for 4.8 years (1.2 ttl pk-yrs)     Types: Cigarettes     Start date: 7/1/2019    Smokeless tobacco: Never   Vaping Use    Vaping Use: Never used   Substance and Sexual Activity    Alcohol use: Not Currently    Drug use: Never    Sexual activity: Not on file     CURRENT MEDICATIONS  Home  "Medications    **Home medications have not yet been reviewed for this encounter**       ALLERGIES  No Known Allergies    PHYSICAL EXAM  VITAL SIGNS: /81   Pulse (!) 122   Temp 36.8 °C (98.2 °F) (Temporal)   Resp 15   Ht 1.626 m (5' 4\")   Wt 62.6 kg (138 lb)   SpO2 96%   BMI 23.69 kg/m²      Constitutional: Well developed, Well nourished, No acute distress, Non-toxic appearance.   HEENT: Normocephalic, Atraumatic,  external ears normal, pharynx pink,  Mucous  Membranes moist, No rhinorrhea or mucosal edema  Eyes: PERRL but pupils are dilated, EOMI, Conjunctiva normal, No discharge.   Neck: Normal range of motion, No tenderness, Supple, No stridor.    Cardiovascular: Tachycardia, regular Rhythm, No murmurs,  rubs, or gallops.   Thorax & Lungs: Lungs clear to auscultation bilaterally, No respiratory distress, No wheezes, rhales or rhonchi, No chest wall tenderness.   Abdomen:  Soft, non tender, non distended,  No pulsatile masses., no rebound guarding or peritoneal signs.   Skin: Diaphoretic, no rash  Back:  No CVA tenderness,  No spinal tenderness, bony crepitance step offs or instability.   Extremities: Equal, intact distal pulses, No cyanosis, clubbing or edema,  No tenderness.   Musculoskeletal: Good range of motion in all major joints. No tenderness to palpation or major deformities noted.   Neurologic: Alert & oriented No focal deficits noted.  Psychiatric: Denies SI or HI, not responding to internal stimuli, denies that this was self harm but was just using drugs with his brother, not aggressive, no flight of ideas    EKG/LABS  Results for orders placed or performed during the hospital encounter of 04/06/24   POC BREATHALIZER   Result Value Ref Range    POC Breathalizer 0.00 0.00 - 0.01 Percent   EKG (NOW)   Result Value Ref Range    Report       [facility] Emergency Dept.    Test Date:  2024-04-06  Pt Name:    DREA JEREZ                 Department: ER  MRN:        [id number]      "                 Room:       [facility]  Gender:     Male                         Technician: [id number]  :        1995                   Requested By:NASRA ALEXIS  Order #:    [id number]                    Reading MD: Nasra Alexis    Measurements  Intervals                                Axis  Rate:       113                          P:          82  MI:         155                          QRS:        85  QRSD:       85                           T:          61  QT:         331  QTc:        454    Interpretive Statements  Sinus tachycardia  Normal axis  Normal intervals  No ST or T wave changes  Compared to ECG 02/10/2023 22:37:59  No significant changes  Electronically Signed On 2024 06:50:29 PDT by Nasra Alexis       I have independently interpreted this EKG        COURSE & MEDICAL DECISION MAKING    ASSESSMENT, COURSE AND PLAN  Care Narrative:   This is a 29-year-old male who presents to the emergency room after he was using crystal meth with his brother at their apartment.  The details are unclear and the patient is unsure why EMS was called but EMS was called and the patient was fighting EMS.  The patient states that he was fighting EMS as he did not know what was going on.  He was brought to the hospital.  On arrival, he is in 4-point restraints and is diaphoretic and tachycardic with dilated pupils consistent with methamphetamine toxidrome.  He does have a chart history of using drugs in the past.  He states that he uses methamphetamine every few days.  He denies that this was an intentional self-harm.  Patient states that he does not know why he is in restraints.  Patient has no acute medical complaints.  His vital signs are likely secondary to methamphetamine use.  No indication for laboratory workup at this time.  EKG was obtained and shows no evidence of acute ischemia.  Low suspicion for acute psychiatric emergency as patient does not appear to be a danger to himself or others.    5:10  "AM Patient removed from restraints, is alert and oriented x 4, calm, cooperative     Will let patient metabolize, will give him a sandwich and let him drink water.    6:20 AM Patient reevaluated at bedside. He is resting comfortably. He has been removed from restraints and is calm and cooperative.  He has eaten a sandwich and drink water.  His tachycardia has improved and is now in the low 100s.  He continues to have no complaints.  I did asked the patient's address and he tells me that he lives \"[address]  Unfortunately is also difficult for him to write the address as well.  I do think that this is because patient is primarily Moroccan-speaking therefore does not know the name in English.  He does not know his brother's phone number.  He gave me consent to call his ex-girlfriend who is number we have on file to get the patient's brother's phone number    6:40 AM I called the patient's ex, Elizabeth, to provide phone number of brother.  She answered and gave me the number.  I attempted to then call the brother but it went to voicemail    6:56 AM We called [facility] dispatch who provided address of [address]  I do think that this is what patient was trying to say but given language barrier it is difficult for him to pronounce as well as right as he is Moroccan-speaking only    7:00 AM patient reevaluated bedside.  He is resting comfortably.  Discussed with him that we will arrange for taxi home.  He was provided close.  He has tolerated p.o. and is ambulatory without difficulty.  Vital signs have improved.  He is no longer diaphoretic.  He has been calm and cooperative this entire ER stay and I again doubt any acute psychiatric emergency.  He will be discharged home.  I discussed that he should stop using methamphetamines.  He declines any methamphetamine cessation resources.  Strict ER return precautions were advised including chest pain, any other concerns.  He is agreeable to discharge plan " with no further questions.          ADDITIONAL PROBLEMS MANAGED  None    DISPOSITION AND DISCUSSIONS  I have discussed management of the patient with the following physicians and MICHELLE's: None    Discussion of management with other [facility] or appropriate source(s): Behavioral Health assisted me in address      Escalation of care considered, and ultimately not performed:Laboratory analysis    Barriers to care at this time, including but not limited to:  None .     Decision tools and prescription drugs considered including, but not limited to:  None .     The patient will return for new or worsening symptoms and is stable at the time of discharge.    The patient is referred to a primary physician for blood pressure management, diabetic screening, and for all other preventative health concerns.      DISPOSITION:  Patient will be discharged home in stable condition.    FOLLOW UP:   doctor [facility], Emergency Dept  [address] [address] [phone]          OUTPATIENT MEDICATIONS:  New Prescriptions    No medications on file         FINAL DIAGNOSIS  1. Methamphetamine use (HCC)           Electronically signed by: Joanie Alexis M.D., 4/6/2024 4:41 AM

## 2024-04-06 NOTE — DISCHARGE INSTRUCTIONS
Por favor no debes usar faisal. Regresa a la lisa de urgencias si tienes mas problemas o cualquier cosa

## (undated) DEVICE — DRAPE 36X28IN RAD CARM BND BG - (25/CA) O

## (undated) DEVICE — MASK ANESTHESIA ADULT  - (100/CA)

## (undated) DEVICE — PAD PREP 24 X 48 CUFFED - (100/CA)

## (undated) DEVICE — LACTATED RINGERS INJ 1000 ML - (14EA/CA 60CA/PF)

## (undated) DEVICE — GOWN WARMING STANDARD FLEX - (30/CA)

## (undated) DEVICE — CHLORAPREP 26 ML APPLICATOR - ORANGE TINT(25/CA)

## (undated) DEVICE — SUTURE 2-0 VICRYL PLUS CT-1 - 8 X 18 INCH(12/BX)

## (undated) DEVICE — BOVIE BLADE COATED &INSULATED - 25/PK

## (undated) DEVICE — CANISTER SUCTION 3000ML MECHANICAL FILTER AUTO SHUTOFF MEDI-VAC NONSTERILE LF DISP  (40EA/CA)

## (undated) DEVICE — GLOVE BIOGEL INDICATOR SZ 8 SURGICAL PF LTX - (50/BX 4BX/CA)

## (undated) DEVICE — DRILL BIT QC 2.0 140MM DEPTH MRK - (6TX2+1TX3=15)

## (undated) DEVICE — SET EXTENSION WITH 2 PORTS (48EA/CA) ***PART #2C8610 IS A SUBSTITUTE*****

## (undated) DEVICE — SUTURE 0 VICRYL PLUS CT-1 - 36 INCH (36/BX)

## (undated) DEVICE — TOWEL STOP TIMEOUT SAFETY FLAG (40EA/CA)

## (undated) DEVICE — SUTURE GENERAL

## (undated) DEVICE — SUTURE 3-0 ETHILON FS-1 - (36/BX) 30 INCH

## (undated) DEVICE — BOVIE BLADE COATED - (50/PK)

## (undated) DEVICE — COVER LIGHT HANDLE ALC PLUS DISP (18EA/BX)

## (undated) DEVICE — GLOVE SZ 6.5 BIOGEL PI MICRO - PF LF (50PR/BX)

## (undated) DEVICE — GLOVE BIOGEL PI ORTHO SZ 6 1/2 SURGICAL PF LF (40PR/BX)

## (undated) DEVICE — PADDING CAST 6 IN STERILE - 6 X 4 YDS (24/CA)

## (undated) DEVICE — SENSOR SPO2 NEO LNCS ADHESIVE (20/BX) SEE USER NOTES

## (undated) DEVICE — BANDAGE ROLL STERILE BULKEE 4.5 IN X 4 YD (100EA/CA)

## (undated) DEVICE — DRILL BIT 2.7 125MM 315.28 (5TX2+1TX1=11)

## (undated) DEVICE — SENSOR OXIMETER ADULT SPO2 RD SET (20EA/BX)

## (undated) DEVICE — DRAPE SURG STERI-DRAPE 7X11OD - (40EA/CA)

## (undated) DEVICE — PACK UPPER EXTREMITY (2EA/CA)

## (undated) DEVICE — GLOVE BIOGEL PI INDICATOR SZ 8.0 SURGICAL PF LF -(50/BX 4BX/CA)

## (undated) DEVICE — KIT ANESTHESIA W/CIRCUIT & 3/LT BAG W/FILTER (20EA/CA)

## (undated) DEVICE — SUTURE 2-0 VICRYL PLUS CT-1 36 (36PK/BX)"

## (undated) DEVICE — GLOVE BIOGEL INDICATOR SZ 7.5 SURGICAL PF LTX - (50PR/BX 4BX/CA)

## (undated) DEVICE — PADDING CAST 4 IN STERILE - 4 X 4 YDS (24/CA)

## (undated) DEVICE — GLOVE BIOGEL PI INDICATOR SZ 6.5 SURGICAL PF LF - (50/BX 4BX/CA)

## (undated) DEVICE — BANDAGE ELASTIC 6 HONEYCOMB - 6X5YD LF (20/CA)"

## (undated) DEVICE — GLOVE BIOGEL PI ORTHO SZ 7.5 PF LF (40PR/BX)

## (undated) DEVICE — DRAPE U SPLIT IMP 54 X 76 - (24/CA)

## (undated) DEVICE — GLOVE SZ 8 BIOGEL PI MICRO - PF LF (50PR/BX)

## (undated) DEVICE — TUBING CLEARLINK DUO-VENT - C-FLO (48EA/CA)

## (undated) DEVICE — GLOVE SZ 7.5 BIOGEL PI MICRO - PF LF (50PR/BX)

## (undated) DEVICE — ELECTRODE 850 FOAM ADHESIVE - HYDROGEL RADIOTRNSPRNT (50/PK)

## (undated) DEVICE — SUCTION INSTRUMENT YANKAUER BULBOUS TIP W/O VENT (50EA/CA)

## (undated) DEVICE — SPLINT PLASTER 5 IN X 30 IN - (50EA/BX 6BX/CA)

## (undated) DEVICE — STOCKINET BIAS 4 IN STERILE - (20/CA)

## (undated) DEVICE — SUTURE 0 VICRYL PLUS CT-1 - 8 X 18 INCH (12/BX)

## (undated) DEVICE — NEPTUNE 4 PORT MANIFOLD - (20/PK)

## (undated) DEVICE — BLADE SURGICAL #15 - (50/BX 3BX/CA)

## (undated) DEVICE — SODIUM CHL IRRIGATION 0.9% 1000ML (12EA/CA)

## (undated) DEVICE — WRAP CO-FLEX 4IN X 5YD STERIL - SELF-ADHERENT (18/CA)

## (undated) DEVICE — PROTECTOR ULNA NERVE - (36PR/CA)

## (undated) DEVICE — GLOVE BIOGEL SZ 7.5 SURGICAL PF LTX - (50PR/BX 4BX/CA)

## (undated) DEVICE — WATER IRRIGATION STERILE 1000ML (12EA/CA)

## (undated) DEVICE — ELECTRODE DUAL RETURN W/ CORD - (50/PK)

## (undated) DEVICE — SET LEADWIRE 5 LEAD BEDSIDE DISPOSABLE ECG (1SET OF 5/EA)

## (undated) DEVICE — DRAPE C ARMOR (12EA/CA)

## (undated) DEVICE — HEAD HOLDER JUNIOR/ADULT

## (undated) DEVICE — DRILL AO SCALED 1.6MM L96MM